# Patient Record
Sex: MALE | Race: WHITE | NOT HISPANIC OR LATINO | URBAN - METROPOLITAN AREA
[De-identification: names, ages, dates, MRNs, and addresses within clinical notes are randomized per-mention and may not be internally consistent; named-entity substitution may affect disease eponyms.]

---
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
## 2022-01-21 ENCOUNTER — TRANSCRIPTION ENCOUNTER (OUTPATIENT)
Age: 22
End: 2022-01-21

## 2022-03-18 ENCOUNTER — TRANSCRIPTION ENCOUNTER (OUTPATIENT)
Age: 22
End: 2022-03-18

## 2022-09-04 ENCOUNTER — EMERGENCY (EMERGENCY)
Facility: HOSPITAL | Age: 22
LOS: 0 days | Discharge: HOME | End: 2022-09-04
Attending: EMERGENCY MEDICINE | Admitting: EMERGENCY MEDICINE

## 2022-09-04 VITALS
WEIGHT: 141.1 LBS | SYSTOLIC BLOOD PRESSURE: 128 MMHG | HEART RATE: 69 BPM | RESPIRATION RATE: 18 BRPM | DIASTOLIC BLOOD PRESSURE: 73 MMHG | OXYGEN SATURATION: 98 % | TEMPERATURE: 99 F
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
## 2022-09-04 DIAGNOSIS — R00.2 PALPITATIONS: ICD-10-CM
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
## 2022-09-04 DIAGNOSIS — R06.02 SHORTNESS OF BREATH: ICD-10-CM

## 2022-09-04 DIAGNOSIS — R07.89 OTHER CHEST PAIN: ICD-10-CM

## 2022-09-04 DIAGNOSIS — K21.9 GASTRO-ESOPHAGEAL REFLUX DISEASE WITHOUT ESOPHAGITIS: ICD-10-CM

## 2022-09-04 DIAGNOSIS — R05.9 COUGH, UNSPECIFIED: ICD-10-CM
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
## 2022-09-04 DIAGNOSIS — Z91.018 ALLERGY TO OTHER FOODS: ICD-10-CM
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
## 2022-09-04 LAB
ALBUMIN SERPL ELPH-MCNC: 4.7 G/DL — SIGNIFICANT CHANGE UP (ref 3.5–5.2)
ALP SERPL-CCNC: 89 U/L — SIGNIFICANT CHANGE UP (ref 30–115)
ALT FLD-CCNC: 10 U/L — LOW (ref 13–38)
ANION GAP SERPL CALC-SCNC: 13 MMOL/L — SIGNIFICANT CHANGE UP (ref 7–14)
AST SERPL-CCNC: 11 U/L — LOW (ref 13–38)
BASOPHILS # BLD AUTO: 0.03 K/UL — SIGNIFICANT CHANGE UP (ref 0–0.2)
BASOPHILS NFR BLD AUTO: 0.5 % — SIGNIFICANT CHANGE UP (ref 0–1)
BILIRUB SERPL-MCNC: 1.6 MG/DL — HIGH (ref 0.2–1.2)
BUN SERPL-MCNC: 13 MG/DL — SIGNIFICANT CHANGE UP (ref 10–20)
CALCIUM SERPL-MCNC: 9.5 MG/DL — SIGNIFICANT CHANGE UP (ref 8.5–10.1)
CHLORIDE SERPL-SCNC: 102 MMOL/L — SIGNIFICANT CHANGE UP (ref 98–110)
CO2 SERPL-SCNC: 25 MMOL/L — SIGNIFICANT CHANGE UP (ref 17–32)
CREAT SERPL-MCNC: 0.7 MG/DL — SIGNIFICANT CHANGE UP (ref 0.3–1)
D DIMER BLD IA.RAPID-MCNC: <150 NG/ML DDU — SIGNIFICANT CHANGE UP (ref 0–230)
EGFR: 136 ML/MIN/1.73M2 — SIGNIFICANT CHANGE UP
EOSINOPHIL # BLD AUTO: 0.19 K/UL — SIGNIFICANT CHANGE UP (ref 0–0.7)
EOSINOPHIL NFR BLD AUTO: 3.1 % — SIGNIFICANT CHANGE UP (ref 0–8)
GLUCOSE SERPL-MCNC: 99 MG/DL — SIGNIFICANT CHANGE UP (ref 70–99)
HCT VFR BLD CALC: 40.8 % — LOW (ref 42–52)
HGB BLD-MCNC: 13.8 G/DL — LOW (ref 14–18)
IMM GRANULOCYTES NFR BLD AUTO: 0.2 % — SIGNIFICANT CHANGE UP (ref 0.1–0.3)
LIDOCAIN IGE QN: 25 U/L — SIGNIFICANT CHANGE UP (ref 7–60)
LYMPHOCYTES # BLD AUTO: 2.78 K/UL — SIGNIFICANT CHANGE UP (ref 1.2–3.4)
LYMPHOCYTES # BLD AUTO: 45.6 % — SIGNIFICANT CHANGE UP (ref 20.5–51.1)
MAGNESIUM SERPL-MCNC: 2 MG/DL — SIGNIFICANT CHANGE UP (ref 1.8–2.4)
MCHC RBC-ENTMCNC: 29.6 PG — SIGNIFICANT CHANGE UP (ref 27–31)
MCHC RBC-ENTMCNC: 33.8 G/DL — SIGNIFICANT CHANGE UP (ref 32–37)
MCV RBC AUTO: 87.6 FL — SIGNIFICANT CHANGE UP (ref 80–94)
MONOCYTES # BLD AUTO: 0.73 K/UL — HIGH (ref 0.1–0.6)
MONOCYTES NFR BLD AUTO: 12 % — HIGH (ref 1.7–9.3)
NEUTROPHILS # BLD AUTO: 2.36 K/UL — SIGNIFICANT CHANGE UP (ref 1.4–6.5)
NEUTROPHILS NFR BLD AUTO: 38.6 % — LOW (ref 42.2–75.2)
NRBC # BLD: 0 /100 WBCS — SIGNIFICANT CHANGE UP (ref 0–0)
PLATELET # BLD AUTO: 216 K/UL — SIGNIFICANT CHANGE UP (ref 130–400)
POTASSIUM SERPL-MCNC: 4.1 MMOL/L — SIGNIFICANT CHANGE UP (ref 3.5–5)
POTASSIUM SERPL-SCNC: 4.1 MMOL/L — SIGNIFICANT CHANGE UP (ref 3.5–5)
PROT SERPL-MCNC: 6.9 G/DL — SIGNIFICANT CHANGE UP (ref 6.1–8)
RBC # BLD: 4.66 M/UL — LOW (ref 4.7–6.1)
RBC # FLD: 11.8 % — SIGNIFICANT CHANGE UP (ref 11.5–14.5)
SODIUM SERPL-SCNC: 140 MMOL/L — SIGNIFICANT CHANGE UP (ref 135–146)
TROPONIN T SERPL-MCNC: <0.01 NG/ML — SIGNIFICANT CHANGE UP
WBC # BLD: 6.1 K/UL — SIGNIFICANT CHANGE UP (ref 4.8–10.8)
WBC # FLD AUTO: 6.1 K/UL — SIGNIFICANT CHANGE UP (ref 4.8–10.8)

## 2022-09-04 PROCEDURE — 99285 EMERGENCY DEPT VISIT HI MDM: CPT

## 2022-09-04 PROCEDURE — 93010 ELECTROCARDIOGRAM REPORT: CPT

## 2022-09-04 PROCEDURE — 71046 X-RAY EXAM CHEST 2 VIEWS: CPT | Mod: 26

## 2022-09-04 RX ORDER — KETOROLAC TROMETHAMINE 30 MG/ML
15 SYRINGE (ML) INJECTION ONCE
Refills: 0 | Status: DISCONTINUED | OUTPATIENT
Start: 2022-09-04 | End: 2022-09-04

## 2022-09-04 RX ORDER — METHOCARBAMOL 500 MG/1
1500 TABLET, FILM COATED ORAL ONCE
Refills: 0 | Status: DISCONTINUED | OUTPATIENT
Start: 2022-09-04 | End: 2022-09-04

## 2022-09-04 RX ORDER — FAMOTIDINE 10 MG/ML
20 INJECTION INTRAVENOUS ONCE
Refills: 0 | Status: COMPLETED | OUTPATIENT
Start: 2022-09-04 | End: 2022-09-04

## 2022-09-04 RX ORDER — SODIUM CHLORIDE 9 MG/ML
1000 INJECTION, SOLUTION INTRAVENOUS ONCE
Refills: 0 | Status: COMPLETED | OUTPATIENT
Start: 2022-09-04 | End: 2022-09-04

## 2022-09-04 RX ADMIN — Medication 15 MILLIGRAM(S): at 21:29

## 2022-09-04 RX ADMIN — FAMOTIDINE 20 MILLIGRAM(S): 10 INJECTION INTRAVENOUS at 22:00

## 2022-09-04 RX ADMIN — SODIUM CHLORIDE 1000 MILLILITER(S): 9 INJECTION, SOLUTION INTRAVENOUS at 21:29

## 2022-09-04 NOTE — ED PEDIATRIC TRIAGE NOTE - CHIEF COMPLAINT QUOTE
" I have pain in my left side chest, palpitations and slight cough x few days." " I know I pulled a muscle. I have pain in my left side chest, palpitations and slight cough x few days."

## 2022-09-04 NOTE — ED PROVIDER NOTE - CARE PROVIDER_API CALL
JOHNSON IRBY  Pediatrics  [address] [place] [address]  [address]  [place] [address]  Phone: [phone]  Fax: [phone]  Follow Up Time: 1-3 Days    Nalini Mclain)  Pediatrics  [address] [place] [address]  Phone: [phone]  Fax: [phone]  Follow Up Time: 1-3 Days

## 2022-09-04 NOTE — ED PROVIDER NOTE - NS ED ROS FT
Eyes:  No visual changes, eye pain or discharge.  ENMT:  No hearing changes, pain, discharge or infections. No neck pain or stiffness.  Cardiac:  (+) chest pain, (+) shortness of breath. (-) edema. No chest pain with exertion.  Respiratory:  (+) mild, non-productive cough. No respiratory distress. No hemoptysis. No history of asthma or RAD.  GI:  No nausea, vomiting, diarrhea or abdominal pain.  :  No dysuria, frequency or burning.  MS:  No myalgia, muscle weakness, joint pain or back pain.  Neuro:  No headache or weakness.  No LOC.  Skin:  No skin rash.   Endocrine: No history of thyroid disease or diabetes.

## 2022-09-04 NOTE — ED PROVIDER NOTE - PROGRESS NOTE DETAILS
ED Attending WINNIE Sullivan  Patient reports feeling better, all results reviewed, report has seen  Cardiologist Dr. Nielson in the past.  Copy of results provided.  Aware signs and symptoms to return for.  Comfortable with outpatient follow-up.

## 2022-09-04 NOTE — ED PROVIDER NOTE - PATIENT PORTAL LINK FT
You can access the FollowMyHealth Patient Portal offered by [facility] by registering at the following website: [email or web address]. By joining [facility]’s FollowMyHealth portal, you will also be able to view your health information using other applications (apps) compatible with our system.

## 2022-09-04 NOTE — ED PROVIDER NOTE - CLINICAL SUMMARY MEDICAL DECISION MAKING FREE TEXT BOX
Patient electing for outpatient management and will follow up immediately with cardiology. Strict return precautions discussed and patient aware they can return at any time for re-evaluation and possible admission. EKG and results discussed.  Patient is a good candidate to attempt outpatient management. Supportive care and home care discussed in detail. Patient aware they may have to return for re-evaluation and possible admission if outpatient treatment fails. Strict return precautions discussed.  Shared decision making utilized and HEART score discussed.

## 2022-09-04 NOTE — ED PROVIDER NOTE - PHYSICAL EXAMINATION
CONSTITUTIONAL: Well-developed; well-nourished; in no acute distress.   SKIN: warm, dry. no rashes.  HEAD: Normocephalic; atraumatic.  EYES: PERRLA, EOMI, no conjunctival erythema.  ENT: No nasal discharge; airway clear. mmm.  NECK: Supple; non tender.  CARD: S1, S2 normal; no murmurs, gallops, or rubs. Regular rate and rhythm. no chest wall ttp. pain is not reproducible.  RESP: No wheezes, rales or rhonchi. no stridor. not in respiratory distress. lungs ctab.  ABD: soft ntnd; no masses, rebound, or guarding. no cvat b/l.  EXT: Normal ROM.  No clubbing, cyanosis or edema. no calf ttp.  NEURO: Alert, oriented, grossly unremarkable. nml motor, strength, and sensation. no focal neuro. deficits. ambulating with a steady gait.  PSYCH: Cooperative, appropriate.

## 2022-09-04 NOTE — ED PROVIDER NOTE - OBJECTIVE STATEMENT
21 year old male with pmh of eczema, GERD presenting to the ED for L sided chest pain. Patient states that he lifted something heavy at work two days ago, has been experiencing L sided/mid-sternal chest pain since then. Associated with palpitations, the palpitations are not brought on by anything specific. Also experiencing shortness of breath. Took tylenol at home which states made the pain worse. Saw his PMD, states had a chest x-ray done which was negative but was told to come to the ED if symptoms get worse. Denies any family hx of sudden cardiac death. Denies any smoking, etoh use, or illicit drug use. No n/v. No leg swelling, no recent travel, no recent illnesses. (+) mild, non-productive cough.    Has been on medications x2 weeks for his acid reflux, states not helping much. 21 year old male with pmh of eczema, GERD presenting to the ED for L sided chest pain. Patient states that he lifted something heavy at work two days ago, has been experiencing L sided/mid-sternal chest pain since then. Pain is described as sharp, constant. Associated with palpitations, the palpitations are not brought on by anything specific. Also experiencing shortness of breath. Took tylenol at home which states made the pain worse. Saw his PMD, states had a chest x-ray done which was negative but was told to come to the ED if symptoms get worse. Denies any family hx of sudden cardiac death. Denies any smoking, etoh use, or illicit drug use. No n/v. No leg swelling, no recent travel, no recent illnesses. (+) mild, non-productive cough.    Has been on medications x2 weeks for his acid reflux, states not helping much.

## 2022-09-04 NOTE — ED PROVIDER NOTE - ATTENDING CONTRIBUTION TO CARE
21-year-old male with past medical history of eczema, GERD, has had an endoscopy in the past, recently prescribed with pantoprazole for which she has been taking, presents with a week of about left-sided chest pain, sharp, radiates to the left side of his ribs, initially intermittent today constant, alleviating or precipitating factors associated with dry cough, palpitations over the past 2 days and shortness of breath patient followed up with PMD few days ago and had a chest x-ray done that was negative and told her symptoms persisted to come to the emergency department.  No family history of PE, DVT, clotting disorders, or cardiac disease at a young age.  No hemoptysis, no recent surgeries, not on steroids/testosterone of note patient does state he did heavy lifting which she thinks exacerbated the left-sided rib and back pain and that has been improving over the past week.  No fever, chills, n/v,  pleuritic cp, diaphoresis,  ha/lh/dizziness, numbness/tingling, neck pain/ stiffness, abd pain, diarrhea, constipation, melena/brbpr, urinary symptoms, trauma, weakness, edema, calf pain/swelling/erythema, sick contacts, recent travel or rash. (-) smoker, no drug abuse.    on exam: non toxic appearing pt sittong on stretcher speaking full sentences, no rash, mmm, neck supple. non-tender , radial pulses 2/4 b/l, no jvd, pain to palpation to midsternal chest wall, ctabl w/ breath sounds present b/l, no wheezing or crackles, good air exchange, good respiratory effort, no accessory muscle use, no tachypnea, no stridor, no pain to palpation to ribs, no crepitus, no subq emphysema, no spinous ttp, no palpable shelves or step offs, bs present throughout all 4 quadrants, abd soft, nd, nt, no rebound tenderness or guarding, no cvat, FROM of ext, no calf pain/swelling/erythema, AAOx3. motor 5/5 and sensation intact throughout upper and lower ext. no focal deficits.

## 2022-09-04 NOTE — ED PEDIATRIC NURSE NOTE - CHIEF COMPLAINT QUOTE
" I know I pulled a muscle. I have pain in my left side chest, palpitations and slight cough x few days."

## 2022-09-04 NOTE — ED PROVIDER NOTE - PROVIDER TOKENS
PROVIDER:[TOKEN:[[id number]],FOLLOWUP:[1-3 Days]],PROVIDER:[TOKEN:[[id number]],FOLLOWUP:[1-3 Days]]

## 2022-09-04 NOTE — ED PROVIDER NOTE - NSFOLLOWUPINSTRUCTIONS_ED_ALL_ED_FT

## 2022-09-04 NOTE — ED PROVIDER NOTE - CARE PLAN
Assessment and plan of treatment:	Plan: EKG, CXR, labs including d dimer, pepcid, toradol, reassess.   1 Principal Discharge DX:	Chest pain  Assessment and plan of treatment:	Plan: EKG, CXR, labs including d dimer, pepcid, toradol, reassess.

## 2022-09-05 RX ORDER — CYCLOBENZAPRINE HYDROCHLORIDE 10 MG/1
1 TABLET, FILM COATED ORAL
Qty: 12 | Refills: 0
Start: 2022-09-05 | End: 2022-09-08

## 2022-09-05 NOTE — ED POST DISCHARGE NOTE - RESULT SUMMARY
PATIENT CALLED: NOW WANTS MUSCLE RELAXER SENT TO PHARM. INITIALLY REFUSED IN ED, BUT NOW WANTS MED. RX SENT FOR FLEXERIL

## 2022-12-25 ENCOUNTER — NON-APPOINTMENT (OUTPATIENT)
Age: 22
End: 2022-12-25

## 2023-02-10 ENCOUNTER — EMERGENCY (EMERGENCY)
Facility: HOSPITAL | Age: 23
LOS: 0 days | Discharge: HOME | End: 2023-02-10
Attending: STUDENT IN AN ORGANIZED HEALTH CARE EDUCATION/TRAINING PROGRAM
Payer: COMMERCIAL

## 2023-02-10 VITALS
RESPIRATION RATE: 18 BRPM | WEIGHT: 145.28 LBS | TEMPERATURE: 99 F | HEART RATE: 78 BPM | OXYGEN SATURATION: 98 % | SYSTOLIC BLOOD PRESSURE: 118 MMHG | DIASTOLIC BLOOD PRESSURE: 59 MMHG

## 2023-02-10 DIAGNOSIS — S05.01XA INJURY OF CONJUNCTIVA AND CORNEAL ABRASION WITHOUT FOREIGN BODY, RIGHT EYE, INITIAL ENCOUNTER: ICD-10-CM
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
## 2023-02-10 DIAGNOSIS — H57.11 OCULAR PAIN, RIGHT EYE: ICD-10-CM

## 2023-02-10 DIAGNOSIS — K21.9 GASTRO-ESOPHAGEAL REFLUX DISEASE WITHOUT ESOPHAGITIS: ICD-10-CM

## 2023-02-10 DIAGNOSIS — T15.91XA FOREIGN BODY ON EXTERNAL EYE, PART UNSPECIFIED, RIGHT EYE, INITIAL ENCOUNTER: ICD-10-CM
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
## 2023-02-10 DIAGNOSIS — Z91.018 ALLERGY TO OTHER FOODS: ICD-10-CM
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
## 2023-02-10 PROCEDURE — 99283 EMERGENCY DEPT VISIT LOW MDM: CPT | Mod: 25

## 2023-02-10 PROCEDURE — 99284 EMERGENCY DEPT VISIT MOD MDM: CPT

## 2023-02-10 PROCEDURE — 67938 REMOVE EYELID FOREIGN BODY: CPT | Mod: RT

## 2023-02-10 RX ORDER — FLUORESCEIN SODIUM 9 MG
1 STRIP OPHTHALMIC (EYE) ONCE
Refills: 0 | Status: COMPLETED | OUTPATIENT
Start: 2023-02-10 | End: 2023-02-10

## 2023-02-10 RX ORDER — POLYMYXIN B SULF/TRIMETHOPRIM 10000-1/ML
1 DROPS OPHTHALMIC (EYE) ONCE
Refills: 0 | Status: COMPLETED | OUTPATIENT
Start: 2023-02-10 | End: 2023-02-10

## 2023-02-10 RX ADMIN — Medication 1 DROP(S): at 19:59

## 2023-02-10 RX ADMIN — Medication 1 DROP(S): at 19:01

## 2023-02-10 RX ADMIN — Medication 1 APPLICATION(S): at 19:02

## 2023-02-10 NOTE — ED PROVIDER NOTE - PATIENT PORTAL LINK FT
You can access the FollowMyHealth Patient Portal offered by [facility] by registering at the following website: [email or web address]. By joining [facility]’s FollowMyHealth portal, you will also be able to view your health information using other applications (apps) compatible with our system.

## 2023-02-10 NOTE — ED PROVIDER NOTE - CLINICAL SUMMARY MEDICAL DECISION MAKING FREE TEXT BOX
22-year-old male with no relevant past medical history, up-to-date on tetanus, does not wear contact lenses presenting today with foreign body to the right eye.  Patient is a , was working with aluminum, endorses feeling a foreign body entry of the right eye this morning, since then has had pain and tearing.  Denies any changes in vision.  Denies any fevers.    Vital stable, on exam noted to have conjunctival injection to the right eye, extraocular was intact.  With fluorescein staining noted to have corneal abrasion at approximately 12:00 on sclera.  Negative Júnior sign.  Minuscule is foreign body to the upper inner eyelid removed with Q-tip.    Patient given Polytrim drops with instructions to use every 3 hours while awake.  Follow-up with PCP this week.  Return precaution discussed.

## 2023-02-10 NOTE — ED PROVIDER NOTE - NSFOLLOWUPINSTRUCTIONS_ED_ALL_ED_FT
Corneal Abrasion    A corneal abrasion is a scratch or injury to the clear covering over the front of your eye (cornea). Your cornea forms a clear dome that protects your eye and helps to focus your vision. Your cornea is made up of many layers. The surface layer is a single layer of cells (corneal epithelium). It is one of the most sensitive tissues in your body. A corneal abrasion can be very painful.    If a corneal abrasion is not treated, it can become infected and cause an ulcer. This can lead to scarring. A scarred cornea can affect your vision. Sometimes abrasions come back in the same area, even after the original injury has healed (recurrent erosion syndrome).    What are the causes?  This condition may be caused by:  A poke in the eye.  A gritty or irritating substance (foreign body) in the eye.  Excessive eye rubbing.  Very dry eyes.  Certain eye infections.  Contact lenses that fit poorly or are worn for a long period of time. You can also injure your cornea when putting contacts lenses in your eye or taking them out.  Eye surgery.  Sometimes, the cause is unknown.    What are the signs or symptoms?  Symptoms of this condition include:  Eye pain. The pain may get worse when your eye is open or when you move your eye.  A feeling of something stuck in your eye.  Having trouble keeping your eye open, or not being able to keep it open.  Tearing and redness.  Sensitivity to light.  Blurred vision.  Headache.  How is this diagnosed?  This condition may be diagnosed based on:  Your medical history.  Your symptoms.  An eye exam. You may work with a health care provider who specializes in diseases and conditions of the eye (ophthalmologist). Before the eye exam, numbing drops may be put into your eye. You may also have dye put in your eye with a dropper or a small paper strip. The dye makes the abrasion easy to see when your ophthalmologist examines your eye with a light. Your ophthalmologist may look at your eye through an eye scope (slit lamp).  How is this treated?  Treatment may vary depending on the cause of your condition, and it may include:  Washing out your eye.  Removing any foreign body.  Antibiotic drops or ointment to treat an infection.  Steroid drops or ointment to treat redness, irritation, or inflammation.  Pain medicine.  An eye patch to keep your eye closed.  Follow these instructions at home:  Medicines     Use eye drops or ointments as told by your eye care provider.  If you were prescribed antibiotic drops or ointment, use them as told by your eye care provider. Do not stop using the antibiotic even if you start to feel better.  Take over-the-counter and prescription medicines only as told by your eye care provider.  Do not drive or use heavy machinery while taking prescription pain medicine.  General instructions     If you have an eye patch, wear it as told by your eye care provider.  Do not drive or use machinery while wearing an eye patch. Your ability to  distances will be impaired.  Follow instructions from your eye care provider about when to remove the patch.  Ask your eye care provider whether you can use a cold, wet cloth (compress) on your eye to relieve pain.  Do not rub or touch your eye. Do not wash out your eye.  Do not wear contact lenses until your eye care provider says that this is okay.  Avoid bright light and eye strain.  Keep all follow-up visits as told by your eye care provider. This is important for preventing infection and vision loss.  Contact a health care provider if:  You continue to have eye pain and other symptoms for more than 2 days.  You develop new symptoms, such as redness, tearing, or discharge.  You have discharge that makes your eyelids stick together in the morning.  Your eye patch becomes so loose that you can blink your eye.  Symptoms return after the original abrasion has healed.  Get help right away if:  You have severe eye pain that does not get better with medicine.  You have vision loss.  Summary  A corneal abrasion is a scratch on the outer layer of the clear covering over the front of your eye (cornea).  Corneal abrasion can cause eye pain, redness, tearing, and blurred vision.  This condition is usually treated with medicine to prevent infection and scarring. You also may have to wear an eye patch to cover your eye.  Let your eye care provider know if your symptoms continue for more than 2 days.  This information is not intended to replace advice given to you by your health care provider. Make sure you discuss any questions you have with your health care provider.    A foreign body is an object on or in the eye that should not be there. It could be a speck of dirt or dust, a hair, an eyelash, a splinter, or any other object. It can be on the outside of the eyeball (extraocular) or inside the eyeball. If the object is on the outside of the eyeball, it can usually be washed out or taken out by your doctor. An object inside the eyeball is an emergency, and it must be treated with surgery.  Follow these instructions at home:  Take over-the-counter and prescription medicines only as told by your doctor. Use eye drops or ointment as told.If you were prescribed antibiotic drops or ointment, use it as told by your doctor. Do not stop using it even if you start to feel better.If you have a bandage on your eye (eye shield):  Wear it as told. Follow instructions from your doctor about when to take it off.Do not drive or use heavy machinery while wearing the bandage.If you do not have a bandage on your eye:  Keep your eye closed as much as possible.Do not rub your eye.Wear dark glasses in bright light.Do not wear contact lenses until your eye feels normal, or as told by your doctor.If you are doing activities with a high risk of eye injury, such as using high-speed tools, wear protective eye covering.Keep all follow-up visits as told by your doctor. This is important.Contact a doctor if:  You have more pain in your eye.You have problems with your eye bandage.You have abnormal fluid (discharge) coming from your eye.Get help right away if:  Your ability to see (vision) gets worse.You have more redness and swelling in or around your eye.Summary  A foreign body is an object on or in the eye that should not be there.An object on the outside of the eyeball can usually be washed out or taken out by your doctor. An object inside the eyeball is an emergency.If you have a bandage on your eye (eye shield), do not drive while wearing it.If you have more pain in your eye, contact your doctor.This information is not intended to replace advice given to you by your health care provider. Make sure you discuss any questions you have with your health care provider.

## 2023-02-10 NOTE — ED PROVIDER NOTE - OBJECTIVE STATEMENT
23 yo M UTD on vaccines and no PMH presenting for R eye pain that started at work a few hours prior. 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 Denies visual changes. Attempted irrigation at home, but still felt pain so came to ER. Denies headache, dizziness, n/w/t, HT, LOC, AC, fever, CP, SOB. Does not wear contacts.

## 2023-02-10 NOTE — ED PROVIDER NOTE - NSFOLLOWUPCLINICS_GEN_ALL_ED_FT
[facility]  Ophthalmolgy  [address], [address]  [place] [address]  Phone: [phone]  Fax:   Follow Up Time: 1-3 Days

## 2023-02-10 NOTE — ED PROVIDER NOTE - PHYSICAL EXAMINATION
CONSTITUTIONAL: well-appearing, in NAD  SKIN: Warm dry, normal skin turgor  HEAD: NCAT  EYES: EOMI, PERRLA, no scleral icterus, conjunctiva pink; +fluoroscein uptake in middle upper quadrant of R eye with small punctate foreign body in upper lid.   ENT: normal pharynx with no erythema or exudates  NECK: Supple; non tender. Full ROM.  CARD: RRR, no murmurs.  RESP: clear to ausculation b/l. No crackles or wheezing.  ABD: soft, non-tender, non-distended, no rebound or guarding.  EXT: Full ROM, no bony tenderness, no pedal edema, no calf tenderness  NEURO: normal motor. normal sensory.  Normal gait.  PSYCH: Cooperative, appropriate.

## 2023-02-10 NOTE — ED PEDIATRIC TRIAGE NOTE - CHIEF COMPLAINT QUOTE
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

## 2023-07-24 ENCOUNTER — NON-APPOINTMENT (OUTPATIENT)
Age: 23
End: 2023-07-24

## 2023-07-24 ENCOUNTER — APPOINTMENT (OUTPATIENT)
Dept: ORTHOPEDIC SURGERY | Facility: CLINIC | Age: 23
End: 2023-07-24
Payer: COMMERCIAL

## 2023-07-24 VITALS — WEIGHT: 145 LBS | BODY MASS INDEX: 28.47 KG/M2 | HEIGHT: 60 IN

## 2023-07-24 DIAGNOSIS — S99.921A UNSPECIFIED INJURY OF RIGHT FOOT, INITIAL ENCOUNTER: ICD-10-CM

## 2023-07-24 PROCEDURE — 99203 OFFICE O/P NEW LOW 30 MIN: CPT

## 2023-07-24 PROCEDURE — 73630 X-RAY EXAM OF FOOT: CPT | Mod: RT

## 2023-07-24 PROCEDURE — L4361: CPT | Mod: RT

## 2023-07-24 PROCEDURE — 73610 X-RAY EXAM OF ANKLE: CPT | Mod: RT

## 2023-07-24 RX ORDER — NAPROXEN 500 MG/1
500 TABLET ORAL
Qty: 28 | Refills: 0 | Status: ACTIVE | COMMUNITY
Start: 2023-07-24 | End: 1900-01-01

## 2023-07-24 NOTE — DATA REVIEWED
[FreeTextEntry1] : X-ray right foot: No acute fractures, subluxations, dislocations. [FreeTextEntry2] : X-ray right ankle: No acute fractures, subluxations, dislocations.

## 2023-07-24 NOTE — DISCUSSION/SUMMARY
[de-identified] : Discussed in detail with patient that he has a right foot/ankle sprain.  Discussed treatment options detail including rest, ice/heat, warm soaks.  Naproxen sent to patient's pharmacy, discuss side effects in detail.  Placed patient in tall cam walker boot weightbearing as tolerated.  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  Patient will call if symptoms worsen or change.  Patient understands agrees with plan.  Call if any questions or concerns.

## 2023-07-24 NOTE — HISTORY OF PRESENT ILLNESS
[de-identified] : Patient is a 22-year-old male accompanied by mother here for evaluation of right foot/ankle.  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  Patient states early this morning he went to sit down stair and twisted his right ankle/foot.  Patient was immediate pain and having trouble bearing weight.  Patient states at this point he has pain when ambulating and bearing weight.  Patient states that he was having mild tingling feeling in the foot which has been improving.

## 2023-07-24 NOTE — PHYSICAL EXAM
[Right] : right foot and ankle [] : negative Parra test [FreeTextEntry8] : Anterior ankle tenderness [FreeTextEntry9] : Mild decreased range of motion with pain [de-identified] : Mild decrease strength with pain

## 2023-08-02 ENCOUNTER — APPOINTMENT (OUTPATIENT)
Dept: ORTHOPEDIC SURGERY | Facility: CLINIC | Age: 23
End: 2023-08-02
Payer: COMMERCIAL

## 2023-08-02 VITALS — WEIGHT: 145 LBS | BODY MASS INDEX: 20.3 KG/M2 | HEIGHT: 71 IN

## 2023-08-02 PROCEDURE — L1902: CPT | Mod: RT

## 2023-08-02 PROCEDURE — 99213 OFFICE O/P EST LOW 20 MIN: CPT

## 2023-08-02 NOTE — HISTORY OF PRESENT ILLNESS
[de-identified] : This is a 22-year-old patient who comes in today with right ankle pain.  He sustained an injury approximately a week and a half ago.  He was seen by one of our physician assistants who recommended using a boot.  He still has pain.  He localized the pain laterally as well as medially.  He denies any calf pain chest pain shortness of breath.

## 2023-08-02 NOTE — DISCUSSION/SUMMARY
[de-identified] : I discussed the patient's findings with him.  At this time the patient would benefit from physical therapy.  He can transition to a lace up ankle brace.  If he still symptomatic in 4 to 6 weeks I will see him back at that time.  All questions answered.

## 2023-08-02 NOTE — PHYSICAL EXAM
[de-identified] : Alert oriented x3.  Pleasant cooperative that exam.  Examination of right lower extremity is undertaken.  Tender palpation over the ATFL.  Full range of motion of the ankle hindfoot midfoot forefoot.  Neurovascular intact distally.

## 2023-08-17 ENCOUNTER — APPOINTMENT (OUTPATIENT)
Dept: ORTHOPEDIC SURGERY | Facility: CLINIC | Age: 23
End: 2023-08-17

## 2023-08-18 ENCOUNTER — APPOINTMENT (OUTPATIENT)
Dept: ORTHOPEDIC SURGERY | Facility: CLINIC | Age: 23
End: 2023-08-18
Payer: COMMERCIAL

## 2023-08-18 PROCEDURE — 99213 OFFICE O/P EST LOW 20 MIN: CPT

## 2023-08-18 NOTE — HISTORY OF PRESENT ILLNESS
[de-identified] :  patient is here for follow-up of his right ankle injury.  He still having significant pain especially at night.  He is been doing physical therapy.  He localized the pain anterolaterally medially.
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
## 2023-08-18 NOTE — PHYSICAL EXAM
[de-identified] :   I examined his right lower extremity.  He still demonstrates tenderness over the anterolateral aspect of his ankle.  This is worsened with dorsiflexion and passive inversion.  He is neurovascular intact distally.

## 2023-08-26 ENCOUNTER — APPOINTMENT (OUTPATIENT)
Dept: MRI IMAGING | Facility: CLINIC | Age: 23
End: 2023-08-26

## 2023-08-29 ENCOUNTER — APPOINTMENT (OUTPATIENT)
Dept: MRI IMAGING | Facility: CLINIC | Age: 23
End: 2023-08-29
Payer: COMMERCIAL

## 2023-08-29 PROCEDURE — 73721 MRI JNT OF LWR EXTRE W/O DYE: CPT | Mod: RT

## 2023-09-01 ENCOUNTER — APPOINTMENT (OUTPATIENT)
Dept: ORTHOPEDIC SURGERY | Facility: CLINIC | Age: 23
End: 2023-09-01
Payer: COMMERCIAL

## 2023-09-01 DIAGNOSIS — S99.911A UNSPECIFIED INJURY OF RIGHT ANKLE, INITIAL ENCOUNTER: ICD-10-CM

## 2023-09-01 PROCEDURE — 99213 OFFICE O/P EST LOW 20 MIN: CPT

## 2023-09-03 PROBLEM — S99.911A INJURY OF RIGHT ANKLE, INITIAL ENCOUNTER: Status: ACTIVE | Noted: 2023-07-24

## 2023-09-03 NOTE — DISCUSSION/SUMMARY
[de-identified] : I discussed the patient's findings with him.  He can utilize physical therapy.  He will continue taking NSAIDs and modifying his activity.  He is currently out of work but I think he is able to return to work in approximately 2 weeks.  He is in agreement with this plan.  All questions answered.

## 2023-09-03 NOTE — PHYSICAL EXAM
[de-identified] : He has painless range of motion of the ankle.  Tender to palpation medially over the posterior tib tendon and deltoid ligament.  Tender laterally over the anterior process of the calcaneus.
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
## 2023-09-03 NOTE — HISTORY OF PRESENT ILLNESS
[de-identified] : Patient returns today for follow-up of his right foot MRI.  His pain is improving slightly.  However he still localizes the pain mostly over the medial aspect of the ankle and lateral aspect of his foot.  He has been utilizing NSAIDs and shoewear modification.

## 2023-09-09 ENCOUNTER — EMERGENCY (EMERGENCY)
Facility: HOSPITAL | Age: 23
LOS: 0 days | Discharge: HOME | End: 2023-09-10
Attending: PEDIATRICS
Payer: COMMERCIAL

## 2023-09-09 VITALS
DIASTOLIC BLOOD PRESSURE: 67 MMHG | SYSTOLIC BLOOD PRESSURE: 130 MMHG | HEART RATE: 97 BPM | RESPIRATION RATE: 18 BRPM | WEIGHT: 138.01 LBS | TEMPERATURE: 98 F | OXYGEN SATURATION: 100 %

## 2023-09-09 DIAGNOSIS — R10.31 RIGHT LOWER QUADRANT PAIN: ICD-10-CM

## 2023-09-09 DIAGNOSIS — K59.00 CONSTIPATION, UNSPECIFIED: ICD-10-CM

## 2023-09-09 DIAGNOSIS — Z87.19 PERSONAL HISTORY OF OTHER DISEASES OF THE DIGESTIVE SYSTEM: ICD-10-CM

## 2023-09-09 LAB
ALBUMIN SERPL ELPH-MCNC: 4.8 G/DL — SIGNIFICANT CHANGE UP (ref 3.5–5.2)
ALP SERPL-CCNC: 93 U/L — SIGNIFICANT CHANGE UP (ref 30–115)
ALT FLD-CCNC: 9 U/L — LOW (ref 13–38)
ANION GAP SERPL CALC-SCNC: 9 MMOL/L — SIGNIFICANT CHANGE UP (ref 7–14)
APPEARANCE UR: ABNORMAL
AST SERPL-CCNC: 12 U/L — LOW (ref 13–38)
BACTERIA # UR AUTO: NEGATIVE /HPF — SIGNIFICANT CHANGE UP
BASOPHILS # BLD AUTO: 0.05 K/UL — SIGNIFICANT CHANGE UP (ref 0–0.2)
BASOPHILS NFR BLD AUTO: 0.5 % — SIGNIFICANT CHANGE UP (ref 0–1)
BILIRUB SERPL-MCNC: 1.5 MG/DL — HIGH (ref 0.2–1.2)
BILIRUB UR-MCNC: NEGATIVE — SIGNIFICANT CHANGE UP
BUN SERPL-MCNC: 14 MG/DL — SIGNIFICANT CHANGE UP (ref 10–20)
CALCIUM SERPL-MCNC: 9.6 MG/DL — SIGNIFICANT CHANGE UP (ref 8.4–10.5)
CAST: 2 /LPF — SIGNIFICANT CHANGE UP (ref 0–4)
CHLORIDE SERPL-SCNC: 102 MMOL/L — SIGNIFICANT CHANGE UP (ref 98–110)
CO2 SERPL-SCNC: 28 MMOL/L — SIGNIFICANT CHANGE UP (ref 17–32)
COLOR SPEC: YELLOW — SIGNIFICANT CHANGE UP
CREAT SERPL-MCNC: 0.9 MG/DL — SIGNIFICANT CHANGE UP (ref 0.7–1.5)
DIFF PNL FLD: NEGATIVE — SIGNIFICANT CHANGE UP
EGFR: 125 ML/MIN/1.73M2 — SIGNIFICANT CHANGE UP
EOSINOPHIL # BLD AUTO: 0.12 K/UL — SIGNIFICANT CHANGE UP (ref 0–0.7)
EOSINOPHIL NFR BLD AUTO: 1.3 % — SIGNIFICANT CHANGE UP (ref 0–8)
GLUCOSE SERPL-MCNC: 97 MG/DL — SIGNIFICANT CHANGE UP (ref 70–99)
GLUCOSE UR QL: NEGATIVE MG/DL — SIGNIFICANT CHANGE UP
HCT VFR BLD CALC: 44.4 % — SIGNIFICANT CHANGE UP (ref 42–52)
HGB BLD-MCNC: 14.8 G/DL — SIGNIFICANT CHANGE UP (ref 14–18)
IMM GRANULOCYTES NFR BLD AUTO: 0.2 % — SIGNIFICANT CHANGE UP (ref 0.1–0.3)
KETONES UR-MCNC: NEGATIVE MG/DL — SIGNIFICANT CHANGE UP
LEUKOCYTE ESTERASE UR-ACNC: NEGATIVE — SIGNIFICANT CHANGE UP
LYMPHOCYTES # BLD AUTO: 1.58 K/UL — SIGNIFICANT CHANGE UP (ref 1.2–3.4)
LYMPHOCYTES # BLD AUTO: 17.3 % — LOW (ref 20.5–51.1)
MCHC RBC-ENTMCNC: 29.5 PG — SIGNIFICANT CHANGE UP (ref 27–31)
MCHC RBC-ENTMCNC: 33.3 G/DL — SIGNIFICANT CHANGE UP (ref 32–37)
MCV RBC AUTO: 88.6 FL — SIGNIFICANT CHANGE UP (ref 80–94)
MONOCYTES # BLD AUTO: 0.73 K/UL — HIGH (ref 0.1–0.6)
MONOCYTES NFR BLD AUTO: 8 % — SIGNIFICANT CHANGE UP (ref 1.7–9.3)
NEUTROPHILS # BLD AUTO: 6.63 K/UL — HIGH (ref 1.4–6.5)
NEUTROPHILS NFR BLD AUTO: 72.7 % — SIGNIFICANT CHANGE UP (ref 42.2–75.2)
NITRITE UR-MCNC: NEGATIVE — SIGNIFICANT CHANGE UP
NRBC # BLD: 0 /100 WBCS — SIGNIFICANT CHANGE UP (ref 0–0)
PH UR: 7 — SIGNIFICANT CHANGE UP (ref 5–8)
PLATELET # BLD AUTO: 232 K/UL — SIGNIFICANT CHANGE UP (ref 130–400)
PMV BLD: 10.6 FL — HIGH (ref 7.4–10.4)
POTASSIUM SERPL-MCNC: 4.6 MMOL/L — SIGNIFICANT CHANGE UP (ref 3.5–5)
POTASSIUM SERPL-SCNC: 4.6 MMOL/L — SIGNIFICANT CHANGE UP (ref 3.5–5)
PROT SERPL-MCNC: 7.3 G/DL — SIGNIFICANT CHANGE UP (ref 6–8)
PROT UR-MCNC: NEGATIVE MG/DL — SIGNIFICANT CHANGE UP
RBC # BLD: 5.01 M/UL — SIGNIFICANT CHANGE UP (ref 4.7–6.1)
RBC # FLD: 11.5 % — SIGNIFICANT CHANGE UP (ref 11.5–14.5)
RBC CASTS # UR COMP ASSIST: 1 /HPF — SIGNIFICANT CHANGE UP (ref 0–4)
SODIUM SERPL-SCNC: 139 MMOL/L — SIGNIFICANT CHANGE UP (ref 135–146)
SP GR SPEC: 1.03 — SIGNIFICANT CHANGE UP (ref 1–1.03)
SQUAMOUS # UR AUTO: 0 /HPF — SIGNIFICANT CHANGE UP (ref 0–5)
UROBILINOGEN FLD QL: 1 MG/DL — SIGNIFICANT CHANGE UP (ref 0.2–1)
WBC # BLD: 9.13 K/UL — SIGNIFICANT CHANGE UP (ref 4.8–10.8)
WBC # FLD AUTO: 9.13 K/UL — SIGNIFICANT CHANGE UP (ref 4.8–10.8)
WBC UR QL: 1 /HPF — SIGNIFICANT CHANGE UP (ref 0–5)

## 2023-09-09 PROCEDURE — 99285 EMERGENCY DEPT VISIT HI MDM: CPT

## 2023-09-09 PROCEDURE — 80053 COMPREHEN METABOLIC PANEL: CPT

## 2023-09-09 PROCEDURE — 99284 EMERGENCY DEPT VISIT MOD MDM: CPT | Mod: 25

## 2023-09-09 PROCEDURE — 85025 COMPLETE CBC W/AUTO DIFF WBC: CPT

## 2023-09-09 PROCEDURE — 81001 URINALYSIS AUTO W/SCOPE: CPT

## 2023-09-09 PROCEDURE — 36415 COLL VENOUS BLD VENIPUNCTURE: CPT

## 2023-09-09 PROCEDURE — 74177 CT ABD & PELVIS W/CONTRAST: CPT | Mod: MA

## 2023-09-09 PROCEDURE — 74018 RADEX ABDOMEN 1 VIEW: CPT

## 2023-09-09 PROCEDURE — 87086 URINE CULTURE/COLONY COUNT: CPT

## 2023-09-09 PROCEDURE — 87591 N.GONORRHOEAE DNA AMP PROB: CPT

## 2023-09-09 RX ADMIN — Medication 1 ENEMA: at 23:22

## 2023-09-10 VITALS
HEART RATE: 75 BPM | OXYGEN SATURATION: 100 % | RESPIRATION RATE: 18 BRPM | TEMPERATURE: 98 F | SYSTOLIC BLOOD PRESSURE: 110 MMHG | DIASTOLIC BLOOD PRESSURE: 62 MMHG

## 2023-09-10 PROCEDURE — 74018 RADEX ABDOMEN 1 VIEW: CPT | Mod: 26

## 2023-09-10 PROCEDURE — 74177 CT ABD & PELVIS W/CONTRAST: CPT | Mod: 26,MA

## 2023-09-10 RX ORDER — IOHEXOL 300 MG/ML
30 INJECTION, SOLUTION INTRAVENOUS ONCE
Refills: 0 | Status: COMPLETED | OUTPATIENT
Start: 2023-09-10 | End: 2023-09-10

## 2023-09-10 RX ORDER — ONDANSETRON 8 MG/1
8 TABLET, FILM COATED ORAL ONCE
Refills: 0 | Status: COMPLETED | OUTPATIENT
Start: 2023-09-10 | End: 2023-09-10

## 2023-09-10 RX ORDER — SODIUM CHLORIDE 9 MG/ML
1000 INJECTION INTRAMUSCULAR; INTRAVENOUS; SUBCUTANEOUS ONCE
Refills: 0 | Status: COMPLETED | OUTPATIENT
Start: 2023-09-10 | End: 2023-09-10

## 2023-09-10 RX ADMIN — IOHEXOL 30 MILLILITER(S): 300 INJECTION, SOLUTION INTRAVENOUS at 00:41

## 2023-09-10 RX ADMIN — ONDANSETRON 8 MILLIGRAM(S): 8 TABLET, FILM COATED ORAL at 00:41

## 2023-09-10 RX ADMIN — SODIUM CHLORIDE 1000 MILLILITER(S): 9 INJECTION INTRAMUSCULAR; INTRAVENOUS; SUBCUTANEOUS at 00:45

## 2023-09-10 NOTE — ED ADULT NURSE NOTE - NSFALLUNIVINTERV_ED_ALL_ED
Bed/Stretcher in lowest position, wheels locked, appropriate side rails in place/Call bell, personal items and telephone in reach/Instruct patient to call for assistance before getting out of bed/chair/stretcher/Non-slip footwear applied when patient is off stretcher/[place] to call system/Physically safe environment - no spills, clutter or unnecessary equipment/Purposeful proactive rounding/Room/bathroom lighting operational, light cord in reach

## 2023-09-10 NOTE — ED PROVIDER NOTE - NSFOLLOWUPINSTRUCTIONS_ED_ALL_ED_FT
Bowel Clean Out    Please complete the following:   - 14 caps of Miralax in 2L of water, please complete within 2 hours  > stools will become "apple juice colored liquid"  >> if stools are still formed or loose, please repeat the clean out    Please begin a daily fiber supplement    Continue 1 cap of Miralax daily

## 2023-09-10 NOTE — ED PROVIDER NOTE - OBJECTIVE STATEMENT
22-year-old male no past medical history presenting with right flank and right lower quadrant pain for the past week and a half that has worsened in severity.  Patient notes that he had not been able to stool for 5 days and only after taking a large quantity of MiraLAX and Dulcolax with able to produce a small amount of stool.  Patient reports mild pain with urination and stooling.  Denies blood in the urine or stool or toilet paper.  Patient usually stools 3 times per week.  Patient drinks plenty of water, reports minimal fiber intake.  Patient notes that the pain comes and goes and has not been consistent for the past week and a half but the pain is now sharper and more painful.  Patient has experienced similar symptoms in the past with constipation however notes that this time it is more severe.  Denies nausea, vomiting, chest pain, shortness of breath, history of kidney stones or trauma.

## 2023-09-10 NOTE — ED PROVIDER NOTE - PATIENT PORTAL LINK FT
You can access the FollowMyHealth Patient Portal offered by [facility] by registering at the following website: [email or web address]. By joining [employer]’s FollowMyHealth portal, you will also be able to view your health information using other applications (apps) compatible with our system.

## 2023-09-10 NOTE — ED PROVIDER NOTE - PHYSICAL EXAMINATION
General: Awake, alert, NAD.  HEENT: NCAT, PERRL, oropharynx without erythema or exudates, TMs non-bulging, non-erythematous, moist mucous membranes.  RESP: CTAB, no increased work of breathing.  CVS: S1, S2, no murmurs, cap refill <2 sec, 2+ peripheral pulses.  ABD: (+) BS, soft, NTND, + stool burden, no CVA tenderness

## 2023-09-11 ENCOUNTER — EMERGENCY (EMERGENCY)
Facility: HOSPITAL | Age: 23
LOS: 0 days | Discharge: HOME | End: 2023-09-11
Attending: STUDENT IN AN ORGANIZED HEALTH CARE EDUCATION/TRAINING PROGRAM
Payer: COMMERCIAL

## 2023-09-11 VITALS
DIASTOLIC BLOOD PRESSURE: 77 MMHG | WEIGHT: 136.03 LBS | TEMPERATURE: 98 F | SYSTOLIC BLOOD PRESSURE: 131 MMHG | RESPIRATION RATE: 18 BRPM | HEART RATE: 88 BPM | OXYGEN SATURATION: 98 %

## 2023-09-11 DIAGNOSIS — R10.31 RIGHT LOWER QUADRANT PAIN: ICD-10-CM

## 2023-09-11 DIAGNOSIS — R10.11 RIGHT UPPER QUADRANT PAIN: ICD-10-CM

## 2023-09-11 DIAGNOSIS — Z87.19 PERSONAL HISTORY OF OTHER DISEASES OF THE DIGESTIVE SYSTEM: ICD-10-CM

## 2023-09-11 DIAGNOSIS — R11.10 VOMITING, UNSPECIFIED: ICD-10-CM

## 2023-09-11 DIAGNOSIS — K59.00 CONSTIPATION, UNSPECIFIED: ICD-10-CM

## 2023-09-11 LAB
ALBUMIN SERPL ELPH-MCNC: 5 G/DL — SIGNIFICANT CHANGE UP (ref 3.5–5.2)
ALP SERPL-CCNC: 81 U/L — SIGNIFICANT CHANGE UP (ref 30–115)
ALT FLD-CCNC: 14 U/L — SIGNIFICANT CHANGE UP (ref 13–38)
ANION GAP SERPL CALC-SCNC: 13 MMOL/L — SIGNIFICANT CHANGE UP (ref 7–14)
AST SERPL-CCNC: 44 U/L — HIGH (ref 13–38)
BASOPHILS # BLD AUTO: 0.04 K/UL — SIGNIFICANT CHANGE UP (ref 0–0.2)
BASOPHILS NFR BLD AUTO: 0.7 % — SIGNIFICANT CHANGE UP (ref 0–1)
BILIRUB SERPL-MCNC: 2.6 MG/DL — HIGH (ref 0.2–1.2)
BUN SERPL-MCNC: 11 MG/DL — SIGNIFICANT CHANGE UP (ref 10–20)
CALCIUM SERPL-MCNC: 9.8 MG/DL — SIGNIFICANT CHANGE UP (ref 8.4–10.5)
CHLORIDE SERPL-SCNC: 99 MMOL/L — SIGNIFICANT CHANGE UP (ref 98–110)
CO2 SERPL-SCNC: 23 MMOL/L — SIGNIFICANT CHANGE UP (ref 17–32)
CREAT SERPL-MCNC: 1 MG/DL — SIGNIFICANT CHANGE UP (ref 0.7–1.5)
CULTURE RESULTS: SIGNIFICANT CHANGE UP
EGFR: 110 ML/MIN/1.73M2 — SIGNIFICANT CHANGE UP
EOSINOPHIL # BLD AUTO: 0.06 K/UL — SIGNIFICANT CHANGE UP (ref 0–0.7)
EOSINOPHIL NFR BLD AUTO: 1 % — SIGNIFICANT CHANGE UP (ref 0–8)
GLUCOSE SERPL-MCNC: 82 MG/DL — SIGNIFICANT CHANGE UP (ref 70–99)
HCT VFR BLD CALC: 44.9 % — SIGNIFICANT CHANGE UP (ref 42–52)
HGB BLD-MCNC: 15.3 G/DL — SIGNIFICANT CHANGE UP (ref 14–18)
IMM GRANULOCYTES NFR BLD AUTO: 0.3 % — SIGNIFICANT CHANGE UP (ref 0.1–0.3)
LYMPHOCYTES # BLD AUTO: 1.43 K/UL — SIGNIFICANT CHANGE UP (ref 1.2–3.4)
LYMPHOCYTES # BLD AUTO: 23.3 % — SIGNIFICANT CHANGE UP (ref 20.5–51.1)
MCHC RBC-ENTMCNC: 29.8 PG — SIGNIFICANT CHANGE UP (ref 27–31)
MCHC RBC-ENTMCNC: 34.1 G/DL — SIGNIFICANT CHANGE UP (ref 32–37)
MCV RBC AUTO: 87.4 FL — SIGNIFICANT CHANGE UP (ref 80–94)
MONOCYTES # BLD AUTO: 0.64 K/UL — HIGH (ref 0.1–0.6)
MONOCYTES NFR BLD AUTO: 10.4 % — HIGH (ref 1.7–9.3)
NEUTROPHILS # BLD AUTO: 3.96 K/UL — SIGNIFICANT CHANGE UP (ref 1.4–6.5)
NEUTROPHILS NFR BLD AUTO: 64.3 % — SIGNIFICANT CHANGE UP (ref 42.2–75.2)
NRBC # BLD: 0 /100 WBCS — SIGNIFICANT CHANGE UP (ref 0–0)
PLATELET # BLD AUTO: 209 K/UL — SIGNIFICANT CHANGE UP (ref 130–400)
PMV BLD: 10.2 FL — SIGNIFICANT CHANGE UP (ref 7.4–10.4)
POTASSIUM SERPL-MCNC: SIGNIFICANT CHANGE UP MMOL/L (ref 3.5–5)
POTASSIUM SERPL-SCNC: SIGNIFICANT CHANGE UP MMOL/L (ref 3.5–5)
PROT SERPL-MCNC: 8.2 G/DL — HIGH (ref 6–8)
RBC # BLD: 5.14 M/UL — SIGNIFICANT CHANGE UP (ref 4.7–6.1)
RBC # FLD: 11.4 % — LOW (ref 11.5–14.5)
SODIUM SERPL-SCNC: 135 MMOL/L — SIGNIFICANT CHANGE UP (ref 135–146)
SPECIMEN SOURCE: SIGNIFICANT CHANGE UP
WBC # BLD: 6.15 K/UL — SIGNIFICANT CHANGE UP (ref 4.8–10.8)
WBC # FLD AUTO: 6.15 K/UL — SIGNIFICANT CHANGE UP (ref 4.8–10.8)

## 2023-09-11 PROCEDURE — 99284 EMERGENCY DEPT VISIT MOD MDM: CPT

## 2023-09-11 PROCEDURE — 96361 HYDRATE IV INFUSION ADD-ON: CPT

## 2023-09-11 PROCEDURE — C9113: CPT

## 2023-09-11 PROCEDURE — 36415 COLL VENOUS BLD VENIPUNCTURE: CPT

## 2023-09-11 PROCEDURE — 80053 COMPREHEN METABOLIC PANEL: CPT

## 2023-09-11 PROCEDURE — 99284 EMERGENCY DEPT VISIT MOD MDM: CPT | Mod: 25

## 2023-09-11 PROCEDURE — 74018 RADEX ABDOMEN 1 VIEW: CPT | Mod: 26

## 2023-09-11 PROCEDURE — 96374 THER/PROPH/DIAG INJ IV PUSH: CPT

## 2023-09-11 PROCEDURE — 85025 COMPLETE CBC W/AUTO DIFF WBC: CPT

## 2023-09-11 PROCEDURE — 74018 RADEX ABDOMEN 1 VIEW: CPT

## 2023-09-11 RX ORDER — PANTOPRAZOLE SODIUM 20 MG/1
40 TABLET, DELAYED RELEASE ORAL ONCE
Refills: 0 | Status: DISCONTINUED | OUTPATIENT
Start: 2023-09-11 | End: 2023-09-11

## 2023-09-11 RX ORDER — SODIUM CHLORIDE 9 MG/ML
1000 INJECTION, SOLUTION INTRAVENOUS ONCE
Refills: 0 | Status: COMPLETED | OUTPATIENT
Start: 2023-09-11 | End: 2023-09-11

## 2023-09-11 RX ORDER — PANTOPRAZOLE SODIUM 20 MG/1
40 TABLET, DELAYED RELEASE ORAL ONCE
Refills: 0 | Status: COMPLETED | OUTPATIENT
Start: 2023-09-11 | End: 2023-09-11

## 2023-09-11 RX ADMIN — SODIUM CHLORIDE 1000 MILLILITER(S): 9 INJECTION, SOLUTION INTRAVENOUS at 19:50

## 2023-09-11 RX ADMIN — SODIUM CHLORIDE 1000 MILLILITER(S): 9 INJECTION, SOLUTION INTRAVENOUS at 18:45

## 2023-09-11 RX ADMIN — PANTOPRAZOLE SODIUM 40 MILLIGRAM(S): 20 TABLET, DELAYED RELEASE ORAL at 19:40

## 2023-09-11 RX ADMIN — Medication 1 ENEMA: at 19:40

## 2023-09-11 NOTE — ED PROVIDER NOTE - PATIENT PORTAL LINK FT
You can access the FollowMyHealth Patient Portal offered by [facility] by registering at the following website: [email or web address]. By joining [employer]’s FollowMyHealth portal, you will also be able to view your health information using other applications (apps) compatible with our system.

## 2023-09-11 NOTE — ED PROVIDER NOTE - PROGRESS NOTE DETAILS
TJY: blood results including bilirubin d/w patient and family including importance of attending GI appt for tmrw. Instructed to discontinue the miralax until seen by GI tomorrow. TJY: pt with small volume BM since enema, encouraged to eat fibrous foods at home.

## 2023-09-11 NOTE — ED PROVIDER NOTE - OBJECTIVE STATEMENT
21yo male no PMHx presenting to ED with constipation x1 week associated with dull ache to the right side of his abdomen, in addition to loss of appetite and nbnb emesis x1 yesterday. Pt has been taking high dose Miralax for constipation without large volume BM, but continuous liquid stool. No F/C. Pt here with same complaint yesterday, second presentation due to non resolution. Has outpatient GI appt scheduled tomorrow.

## 2023-09-11 NOTE — ED PROVIDER NOTE - CLINICAL SUMMARY MEDICAL DECISION MAKING FREE TEXT BOX
22-year-old male seen in the ED September 9 for right flank pain rating to his right lower quadrant as well as constipation at that time he had blood work done UA was negative KUB that demonstrated mild to moderate stool burden and a CAT scan performed that showed no evidence of acute abdominal pathology patient was given a Fleet enema and at the time but states has not had a bowel movement after the Fleet enema was discharged and told to use 14 scoops of MiraLAX in a 2 gallon bottle which has been using called his PMD who also prescribed Ex-Lax and now has been having just yellowish water come out of his stool but no actual formed stool which is what prompted the visit patient has an appointment with gastroenterologist Dr. Cristobal tomorrow.  Patient also had an episode of nausea no fevers vs reviewed labs imaging obtained and reviewed KUB looked improved from prior visit. Patient a spoken to in detail about results  All questions addressed.  Results of ED work up discussed and patient given a copy of the results. Patient has proper follow up. Return precautions given. patient to follow up with GI dr. cristobal following day.

## 2023-09-11 NOTE — ED ADULT TRIAGE NOTE - CHIEF COMPLAINT QUOTE
Patient a+ox3 co right side abdominal pain X 1 week with constipation and nausea- reports was seen in ED 9/9 for same complaint and dc given enema but still having pain

## 2023-09-11 NOTE — ED PROVIDER NOTE - PHYSICAL EXAMINATION
VITAL SIGNS: I have reviewed nursing notes and confirm.  CONSTITUTIONAL: Well-appearing, non-toxic, in NAD  SKIN: Warm dry, normal skin turgor  HEAD: NCAT  EYES: No conjunctival injection, scleral anicteric  ENT: MMM  NECK: Supple; FROM.   ABD: Soft, NDNT. No CVAT.   EXT: Normal gait.   NEURO: Grossly normal examination, CN grossly intact.   PSYCH: Cooperative, appropriate

## 2023-09-11 NOTE — ED PROVIDER NOTE - ATTENDING CONTRIBUTION TO CARE
22-year-old male seen in the ED September 9 for right flank pain rating to his right lower quadrant as well as constipation at that time he had blood work done UA was negative KUB that demonstrated mild to moderate stool burden and a CAT scan performed that showed no evidence of acute abdominal pathology patient was given a Fleet enema and at the time but states has not had a bowel movement after the Fleet enema was discharged and told to use 14 scoops of MiraLAX in a 2 gallon bottle which has been using called his PMD who also prescribed Ex-Lax and now has been having just yellowish water come out of his stool but no actual formed stool which is what prompted the visit patient has an appointment with gastroenterologist Dr. Cristobal tomorrow.  Patient also had an episode of nausea no fevers   CONSTITUTIONAL: WA / WN / NAD  HEAD: NCAT  EYES: PERRL; EOMI; anicteric.  ENT: Normal pharynx; membranes pink/moist, no erythema.  NECK: Supple;   ABD: Soft, NT ND no cv  MSK/EXT: No gross deformities; full range of motion.  SKIN: Warm and dry;   NEURO: AAOx3, Motor 5/5 x 4 extremities  PSYCH: Memory Intact, Normal Affect

## 2023-09-11 NOTE — ED PROVIDER NOTE - NSFOLLOWUPINSTRUCTIONS_ED_ALL_ED_FT
Hold off on Miralax until attending your gastroenterology appointment tomorrow.  You can resume eating foods, especially those high in fiber.  Drink plenty of water.  You can take tylenol at home for any worsening pain.     Constipation    Constipation is when a person has fewer than three bowel movements a week, has difficulty having a bowel movement, or has stools that are dry, hard, or larger than normal. Other symptoms can include abdominal pain or bloating. As people grow older, constipation is more common. A low-fiber diet, not taking in enough fluids, and taking certain medicines, including opioid painkillers, may make constipation worse. Treatment varies but may include dietary modifications (more fiber-rich foods), lifestyle modifications, and possible medications.     SEEK IMMEDIATE MEDICAL CARE IF YOU HAVE ANY OF THE FOLLOWING SYMPTOMS: bright red blood in your stool, constipation for longer than 4 days, abdominal or rectal pain, unexplained weight loss, or inability to pass gas.

## 2023-09-12 LAB
N GONORRHOEA RRNA SPEC QL NAA+PROBE: SIGNIFICANT CHANGE UP
SPECIMEN SOURCE: SIGNIFICANT CHANGE UP

## 2023-09-14 ENCOUNTER — OUTPATIENT (OUTPATIENT)
Dept: OUTPATIENT SERVICES | Facility: HOSPITAL | Age: 23
LOS: 1 days | End: 2023-09-14
Payer: COMMERCIAL

## 2023-09-14 DIAGNOSIS — K59.00 CONSTIPATION, UNSPECIFIED: ICD-10-CM

## 2023-09-14 DIAGNOSIS — Z00.8 ENCOUNTER FOR OTHER GENERAL EXAMINATION: ICD-10-CM

## 2023-09-14 PROCEDURE — 76700 US EXAM ABDOM COMPLETE: CPT

## 2023-09-14 PROCEDURE — 76700 US EXAM ABDOM COMPLETE: CPT | Mod: 26

## 2023-09-15 DIAGNOSIS — K59.00 CONSTIPATION, UNSPECIFIED: ICD-10-CM

## 2023-12-09 ENCOUNTER — NON-APPOINTMENT (OUTPATIENT)
Age: 23
End: 2023-12-09

## 2023-12-13 ENCOUNTER — EMERGENCY (EMERGENCY)
Facility: HOSPITAL | Age: 23
LOS: 0 days | Discharge: HOME | End: 2023-12-13
Attending: EMERGENCY MEDICINE
Payer: COMMERCIAL

## 2023-12-13 VITALS
HEIGHT: 71 IN | OXYGEN SATURATION: 100 % | DIASTOLIC BLOOD PRESSURE: 56 MMHG | HEART RATE: 73 BPM | SYSTOLIC BLOOD PRESSURE: 121 MMHG | RESPIRATION RATE: 20 BRPM | WEIGHT: 145.06 LBS | TEMPERATURE: 98 F

## 2023-12-13 DIAGNOSIS — B34.9 VIRAL INFECTION, UNSPECIFIED: ICD-10-CM

## 2023-12-13 DIAGNOSIS — Z91.018 ALLERGY TO OTHER FOODS: ICD-10-CM

## 2023-12-13 DIAGNOSIS — R06.02 SHORTNESS OF BREATH: ICD-10-CM

## 2023-12-13 DIAGNOSIS — R42 DIZZINESS AND GIDDINESS: ICD-10-CM

## 2023-12-13 DIAGNOSIS — R05.9 COUGH, UNSPECIFIED: ICD-10-CM

## 2023-12-13 DIAGNOSIS — Z20.822 CONTACT WITH AND (SUSPECTED) EXPOSURE TO COVID-19: ICD-10-CM

## 2023-12-13 LAB
ALBUMIN SERPL ELPH-MCNC: 4.5 G/DL — SIGNIFICANT CHANGE UP (ref 3.5–5.2)
ALBUMIN SERPL ELPH-MCNC: 4.5 G/DL — SIGNIFICANT CHANGE UP (ref 3.5–5.2)
ALP SERPL-CCNC: 92 U/L — SIGNIFICANT CHANGE UP (ref 30–115)
ALP SERPL-CCNC: 92 U/L — SIGNIFICANT CHANGE UP (ref 30–115)
ALT FLD-CCNC: 14 U/L — SIGNIFICANT CHANGE UP (ref 0–41)
ALT FLD-CCNC: 14 U/L — SIGNIFICANT CHANGE UP (ref 0–41)
ANION GAP SERPL CALC-SCNC: 14 MMOL/L — SIGNIFICANT CHANGE UP (ref 7–14)
ANION GAP SERPL CALC-SCNC: 14 MMOL/L — SIGNIFICANT CHANGE UP (ref 7–14)
AST SERPL-CCNC: 13 U/L — SIGNIFICANT CHANGE UP (ref 0–41)
AST SERPL-CCNC: 13 U/L — SIGNIFICANT CHANGE UP (ref 0–41)
BASOPHILS # BLD AUTO: 0.02 K/UL — SIGNIFICANT CHANGE UP (ref 0–0.2)
BASOPHILS # BLD AUTO: 0.02 K/UL — SIGNIFICANT CHANGE UP (ref 0–0.2)
BASOPHILS NFR BLD AUTO: 0.3 % — SIGNIFICANT CHANGE UP (ref 0–1)
BASOPHILS NFR BLD AUTO: 0.3 % — SIGNIFICANT CHANGE UP (ref 0–1)
BILIRUB SERPL-MCNC: 0.9 MG/DL — SIGNIFICANT CHANGE UP (ref 0.2–1.2)
BILIRUB SERPL-MCNC: 0.9 MG/DL — SIGNIFICANT CHANGE UP (ref 0.2–1.2)
BUN SERPL-MCNC: 15 MG/DL — SIGNIFICANT CHANGE UP (ref 10–20)
BUN SERPL-MCNC: 15 MG/DL — SIGNIFICANT CHANGE UP (ref 10–20)
CALCIUM SERPL-MCNC: 9.4 MG/DL — SIGNIFICANT CHANGE UP (ref 8.4–10.5)
CALCIUM SERPL-MCNC: 9.4 MG/DL — SIGNIFICANT CHANGE UP (ref 8.4–10.5)
CHLORIDE SERPL-SCNC: 101 MMOL/L — SIGNIFICANT CHANGE UP (ref 98–110)
CHLORIDE SERPL-SCNC: 101 MMOL/L — SIGNIFICANT CHANGE UP (ref 98–110)
CO2 SERPL-SCNC: 25 MMOL/L — SIGNIFICANT CHANGE UP (ref 17–32)
CO2 SERPL-SCNC: 25 MMOL/L — SIGNIFICANT CHANGE UP (ref 17–32)
CREAT SERPL-MCNC: 0.9 MG/DL — SIGNIFICANT CHANGE UP (ref 0.7–1.5)
CREAT SERPL-MCNC: 0.9 MG/DL — SIGNIFICANT CHANGE UP (ref 0.7–1.5)
EGFR: 125 ML/MIN/1.73M2 — SIGNIFICANT CHANGE UP
EGFR: 125 ML/MIN/1.73M2 — SIGNIFICANT CHANGE UP
EOSINOPHIL # BLD AUTO: 0.03 K/UL — SIGNIFICANT CHANGE UP (ref 0–0.7)
EOSINOPHIL # BLD AUTO: 0.03 K/UL — SIGNIFICANT CHANGE UP (ref 0–0.7)
EOSINOPHIL NFR BLD AUTO: 0.4 % — SIGNIFICANT CHANGE UP (ref 0–8)
EOSINOPHIL NFR BLD AUTO: 0.4 % — SIGNIFICANT CHANGE UP (ref 0–8)
FLUAV AG NPH QL: SIGNIFICANT CHANGE UP
FLUAV AG NPH QL: SIGNIFICANT CHANGE UP
FLUBV AG NPH QL: SIGNIFICANT CHANGE UP
FLUBV AG NPH QL: SIGNIFICANT CHANGE UP
GLUCOSE SERPL-MCNC: 107 MG/DL — HIGH (ref 70–99)
GLUCOSE SERPL-MCNC: 107 MG/DL — HIGH (ref 70–99)
HCT VFR BLD CALC: 41.3 % — LOW (ref 42–52)
HCT VFR BLD CALC: 41.3 % — LOW (ref 42–52)
HGB BLD-MCNC: 14.4 G/DL — SIGNIFICANT CHANGE UP (ref 14–18)
HGB BLD-MCNC: 14.4 G/DL — SIGNIFICANT CHANGE UP (ref 14–18)
IMM GRANULOCYTES NFR BLD AUTO: 0.1 % — SIGNIFICANT CHANGE UP (ref 0.1–0.3)
IMM GRANULOCYTES NFR BLD AUTO: 0.1 % — SIGNIFICANT CHANGE UP (ref 0.1–0.3)
LIDOCAIN IGE QN: 19 U/L — SIGNIFICANT CHANGE UP (ref 7–60)
LIDOCAIN IGE QN: 19 U/L — SIGNIFICANT CHANGE UP (ref 7–60)
LYMPHOCYTES # BLD AUTO: 1.63 K/UL — SIGNIFICANT CHANGE UP (ref 1.2–3.4)
LYMPHOCYTES # BLD AUTO: 1.63 K/UL — SIGNIFICANT CHANGE UP (ref 1.2–3.4)
LYMPHOCYTES # BLD AUTO: 24.4 % — SIGNIFICANT CHANGE UP (ref 20.5–51.1)
LYMPHOCYTES # BLD AUTO: 24.4 % — SIGNIFICANT CHANGE UP (ref 20.5–51.1)
MCHC RBC-ENTMCNC: 30.1 PG — SIGNIFICANT CHANGE UP (ref 27–31)
MCHC RBC-ENTMCNC: 30.1 PG — SIGNIFICANT CHANGE UP (ref 27–31)
MCHC RBC-ENTMCNC: 34.9 G/DL — SIGNIFICANT CHANGE UP (ref 32–37)
MCHC RBC-ENTMCNC: 34.9 G/DL — SIGNIFICANT CHANGE UP (ref 32–37)
MCV RBC AUTO: 86.2 FL — SIGNIFICANT CHANGE UP (ref 80–94)
MCV RBC AUTO: 86.2 FL — SIGNIFICANT CHANGE UP (ref 80–94)
MONOCYTES # BLD AUTO: 0.53 K/UL — SIGNIFICANT CHANGE UP (ref 0.1–0.6)
MONOCYTES # BLD AUTO: 0.53 K/UL — SIGNIFICANT CHANGE UP (ref 0.1–0.6)
MONOCYTES NFR BLD AUTO: 7.9 % — SIGNIFICANT CHANGE UP (ref 1.7–9.3)
MONOCYTES NFR BLD AUTO: 7.9 % — SIGNIFICANT CHANGE UP (ref 1.7–9.3)
NEUTROPHILS # BLD AUTO: 4.47 K/UL — SIGNIFICANT CHANGE UP (ref 1.4–6.5)
NEUTROPHILS # BLD AUTO: 4.47 K/UL — SIGNIFICANT CHANGE UP (ref 1.4–6.5)
NEUTROPHILS NFR BLD AUTO: 66.9 % — SIGNIFICANT CHANGE UP (ref 42.2–75.2)
NEUTROPHILS NFR BLD AUTO: 66.9 % — SIGNIFICANT CHANGE UP (ref 42.2–75.2)
NRBC # BLD: 0 /100 WBCS — SIGNIFICANT CHANGE UP (ref 0–0)
NRBC # BLD: 0 /100 WBCS — SIGNIFICANT CHANGE UP (ref 0–0)
PLATELET # BLD AUTO: 233 K/UL — SIGNIFICANT CHANGE UP (ref 130–400)
PLATELET # BLD AUTO: 233 K/UL — SIGNIFICANT CHANGE UP (ref 130–400)
PMV BLD: 10.2 FL — SIGNIFICANT CHANGE UP (ref 7.4–10.4)
PMV BLD: 10.2 FL — SIGNIFICANT CHANGE UP (ref 7.4–10.4)
POTASSIUM SERPL-MCNC: 4.3 MMOL/L — SIGNIFICANT CHANGE UP (ref 3.5–5)
POTASSIUM SERPL-MCNC: 4.3 MMOL/L — SIGNIFICANT CHANGE UP (ref 3.5–5)
POTASSIUM SERPL-SCNC: 4.3 MMOL/L — SIGNIFICANT CHANGE UP (ref 3.5–5)
POTASSIUM SERPL-SCNC: 4.3 MMOL/L — SIGNIFICANT CHANGE UP (ref 3.5–5)
PROT SERPL-MCNC: 7.1 G/DL — SIGNIFICANT CHANGE UP (ref 6–8)
PROT SERPL-MCNC: 7.1 G/DL — SIGNIFICANT CHANGE UP (ref 6–8)
RBC # BLD: 4.79 M/UL — SIGNIFICANT CHANGE UP (ref 4.7–6.1)
RBC # BLD: 4.79 M/UL — SIGNIFICANT CHANGE UP (ref 4.7–6.1)
RBC # FLD: 11.6 % — SIGNIFICANT CHANGE UP (ref 11.5–14.5)
RBC # FLD: 11.6 % — SIGNIFICANT CHANGE UP (ref 11.5–14.5)
RSV RNA NPH QL NAA+NON-PROBE: SIGNIFICANT CHANGE UP
RSV RNA NPH QL NAA+NON-PROBE: SIGNIFICANT CHANGE UP
SARS-COV-2 RNA SPEC QL NAA+PROBE: SIGNIFICANT CHANGE UP
SARS-COV-2 RNA SPEC QL NAA+PROBE: SIGNIFICANT CHANGE UP
SODIUM SERPL-SCNC: 140 MMOL/L — SIGNIFICANT CHANGE UP (ref 135–146)
SODIUM SERPL-SCNC: 140 MMOL/L — SIGNIFICANT CHANGE UP (ref 135–146)
TROPONIN T SERPL-MCNC: <0.01 NG/ML — SIGNIFICANT CHANGE UP
TROPONIN T SERPL-MCNC: <0.01 NG/ML — SIGNIFICANT CHANGE UP
WBC # BLD: 6.69 K/UL — SIGNIFICANT CHANGE UP (ref 4.8–10.8)
WBC # BLD: 6.69 K/UL — SIGNIFICANT CHANGE UP (ref 4.8–10.8)
WBC # FLD AUTO: 6.69 K/UL — SIGNIFICANT CHANGE UP (ref 4.8–10.8)
WBC # FLD AUTO: 6.69 K/UL — SIGNIFICANT CHANGE UP (ref 4.8–10.8)

## 2023-12-13 PROCEDURE — 0241U: CPT

## 2023-12-13 PROCEDURE — 99285 EMERGENCY DEPT VISIT HI MDM: CPT | Mod: 25

## 2023-12-13 PROCEDURE — 80053 COMPREHEN METABOLIC PANEL: CPT

## 2023-12-13 PROCEDURE — 99285 EMERGENCY DEPT VISIT HI MDM: CPT

## 2023-12-13 PROCEDURE — 71045 X-RAY EXAM CHEST 1 VIEW: CPT | Mod: 26

## 2023-12-13 PROCEDURE — 83690 ASSAY OF LIPASE: CPT

## 2023-12-13 PROCEDURE — 84484 ASSAY OF TROPONIN QUANT: CPT

## 2023-12-13 PROCEDURE — 93005 ELECTROCARDIOGRAM TRACING: CPT

## 2023-12-13 PROCEDURE — 71045 X-RAY EXAM CHEST 1 VIEW: CPT

## 2023-12-13 PROCEDURE — 36415 COLL VENOUS BLD VENIPUNCTURE: CPT

## 2023-12-13 PROCEDURE — 85025 COMPLETE CBC W/AUTO DIFF WBC: CPT

## 2023-12-13 PROCEDURE — 93010 ELECTROCARDIOGRAM REPORT: CPT

## 2023-12-13 RX ORDER — SODIUM CHLORIDE 9 MG/ML
1000 INJECTION, SOLUTION INTRAVENOUS ONCE
Refills: 0 | Status: COMPLETED | OUTPATIENT
Start: 2023-12-13 | End: 2023-12-13

## 2023-12-13 RX ADMIN — SODIUM CHLORIDE 1000 MILLILITER(S): 9 INJECTION, SOLUTION INTRAVENOUS at 14:26

## 2023-12-13 NOTE — ED ADULT NURSE NOTE - NSFALLUNIVINTERV_ED_ALL_ED
Bed/Stretcher in lowest position, wheels locked, appropriate side rails in place/Call bell, personal items and telephone in reach/Instruct patient to call for assistance before getting out of bed/chair/stretcher/Non-slip footwear applied when patient is off stretcher/[place] to call system/Physically safe environment - no spills, clutter or unnecessary equipment/Purposeful proactive rounding/Room/bathroom lighting operational, light cord in reach Bed/Stretcher in lowest position, wheels locked, appropriate side rails in place/Call bell, personal items and telephone in reach/Instruct patient to call for assistance before getting out of bed/chair/stretcher/Non-slip footwear applied when patient is off stretcher/[place] to call system/Physically safe environment - no spills, clutter or unnecessary equipment/Purposeful proactive rounding/Room/bathroom lighting operational, light cord in reach

## 2023-12-13 NOTE — ED PROVIDER NOTE - OBJECTIVE STATEMENT
23-year-old male with no past medical history presenting for evaluation of cough, dizziness, and feeling out of breath while at work today.  Patient states that he has been having viral-like symptoms over the last few days.  Denies any chest pain.  Patient also reports feeling tingling throughout his body.  No fevers or chills.  No leg swelling or calf pain.

## 2023-12-13 NOTE — ED PROVIDER NOTE - PATIENT PORTAL LINK FT
You can access the FollowMyHealth Patient Portal offered by [facility] by registering at the following website: [email or web address]. By joining [employer]’s FollowMyHealth portal, you will also be able to view your health information using other applications (apps) compatible with our system. You can access the FollowMyHealth Patient Portal offered by [facility] by registering at the following website: [email or web address]. By joining [employer]’s FollowMyHealth portal, you will also be able to view your health information using other applications (apps) compatible with our system.

## 2023-12-13 NOTE — ED PROVIDER NOTE - COVID-19 ORDERING FACILITY
[facility]  - [facility]  - [facility]

## 2023-12-13 NOTE — ED ADULT TRIAGE NOTE - BMI (KG/M2)
Caller: Burke Cedeno Jr    Relationship: Self    Best call back number: [phone]    What medication are you requesting: PAIN MEDICATION     What are your current symptoms: BACK PAIN     How long have you been experiencing symptoms: ONE WEEK     Have you had these symptoms before:    [] Yes  [x] No    Have you been treated for these symptoms before:   [] Yes  [x] No    If a prescription is needed, what is your preferred pharmacy and phone number: [id number] - [place], [place] - [address] [phone] [facility] [phone]         20.2

## 2023-12-13 NOTE — ED ADULT NURSE NOTE - COVID-19 ORDERING FACILITY
[facility]  - [facility]  - [facility]

## 2023-12-13 NOTE — ED PROVIDER NOTE - CLINICAL SUMMARY MEDICAL DECISION MAKING FREE TEXT BOX
Patient treated with IV fluids.  Labs and chest x-ray were obtained.  Chest x-ray results reviewed as well as labs.  Results yakelin patient and mom.  Patient is feeling better.  Recommend continued supportive care.  Patient to follow-up PMD and return if any worsening symptoms or concerns

## 2023-12-13 NOTE — ED PROVIDER NOTE - ATTENDING APP SHARED VISIT CONTRIBUTION OF CARE
22 yo M Presents with mom for evaluation of cough for the last few days.  Patient states he had fever which is resolved and today was the first day he returned back to work.  Patient states he started to feel short of breath at work and having tingling to his hands feet and face.  No chest pain, on exam patient in NAD, AAOx3, lungs CTA B/L, OP clear, 24 yo M Presents with mom for evaluation of cough for the last few days.  Patient states he had fever which is resolved and today was the first day he returned back to work.  Patient states he started to feel short of breath at work and having tingling to his hands feet and face.  No chest pain, on exam patient in NAD, AAOx3, lungs CTA B/L, OP clear,

## 2024-02-09 ENCOUNTER — EMERGENCY (EMERGENCY)
Facility: HOSPITAL | Age: 24
LOS: 0 days | Discharge: HOME | End: 2024-02-10
Attending: EMERGENCY MEDICINE
Payer: COMMERCIAL

## 2024-02-09 VITALS
DIASTOLIC BLOOD PRESSURE: 60 MMHG | HEART RATE: 82 BPM | OXYGEN SATURATION: 98 % | RESPIRATION RATE: 18 BRPM | TEMPERATURE: 98 F | SYSTOLIC BLOOD PRESSURE: 127 MMHG | HEIGHT: 71 IN | WEIGHT: 145.06 LBS

## 2024-02-09 DIAGNOSIS — M25.552 PAIN IN LEFT HIP: ICD-10-CM

## 2024-02-09 DIAGNOSIS — M25.562 PAIN IN LEFT KNEE: ICD-10-CM

## 2024-02-09 PROCEDURE — 70450 CT HEAD/BRAIN W/O DYE: CPT | Mod: MA

## 2024-02-09 PROCEDURE — 99285 EMERGENCY DEPT VISIT HI MDM: CPT

## 2024-02-09 PROCEDURE — 72170 X-RAY EXAM OF PELVIS: CPT

## 2024-02-09 PROCEDURE — 73564 X-RAY EXAM KNEE 4 OR MORE: CPT | Mod: LT

## 2024-02-09 PROCEDURE — 99284 EMERGENCY DEPT VISIT MOD MDM: CPT | Mod: 25

## 2024-02-09 PROCEDURE — 99053 MED SERV 10PM-8AM 24 HR FAC: CPT

## 2024-02-09 RX ORDER — METHOCARBAMOL 500 MG/1
1500 TABLET, FILM COATED ORAL ONCE
Refills: 0 | Status: COMPLETED | OUTPATIENT
Start: 2024-02-09 | End: 2024-02-09

## 2024-02-09 RX ORDER — LIDOCAINE 4 G/100G
1 CREAM TOPICAL ONCE
Refills: 0 | Status: COMPLETED | OUTPATIENT
Start: 2024-02-09 | End: 2024-02-09

## 2024-02-09 RX ORDER — IBUPROFEN 200 MG
600 TABLET ORAL ONCE
Refills: 0 | Status: COMPLETED | OUTPATIENT
Start: 2024-02-09 | End: 2024-02-09

## 2024-02-09 RX ADMIN — METHOCARBAMOL 1500 MILLIGRAM(S): 500 TABLET, FILM COATED ORAL at 23:15

## 2024-02-09 RX ADMIN — LIDOCAINE 1 PATCH: 4 CREAM TOPICAL at 23:16

## 2024-02-09 RX ADMIN — Medication 600 MILLIGRAM(S): at 23:15
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
## 2024-02-09 NOTE — ED ADULT NURSE NOTE - NS ED NURSE DC INFO COMPLEXITY
Moderate: Comprehensive teaching Cyclosporine Counseling:  I discussed with the patient the risks of cyclosporine including but not limited to hypertension, gingival hyperplasia,myelosuppression, immunosuppression, liver damage, kidney damage, neurotoxicity, lymphoma, and serious infections. The patient understands that monitoring is required including baseline blood pressure, CBC, CMP, lipid panel and uric acid, and then 1-2 times monthly CMP and blood pressure.

## 2024-02-10 PROCEDURE — 70450 CT HEAD/BRAIN W/O DYE: CPT | Mod: 26,MA

## 2024-02-10 PROCEDURE — 73564 X-RAY EXAM KNEE 4 OR MORE: CPT | Mod: 26,LT

## 2024-02-10 PROCEDURE — 72170 X-RAY EXAM OF PELVIS: CPT | Mod: 26

## 2024-02-10 RX ORDER — MELOXICAM 15 MG/1
1 TABLET ORAL
Qty: 5 | Refills: 0
Start: 2024-02-10 | End: 2024-02-14

## 2024-02-10 RX ORDER — METHOCARBAMOL 500 MG/1
4 TABLET, FILM COATED ORAL
Qty: 20 | Refills: 0
Start: 2024-02-10 | End: 2024-02-14
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
## 2024-02-10 NOTE — ED PROVIDER NOTE - PATIENT PORTAL LINK FT
You can access the FollowMyHealth Patient Portal offered by [facility] by registering at the following website: [email or web address]. By joining [employer]’s FollowMyHealth portal, you will also be able to view your health information using other applications (apps) compatible with our system.

## 2024-02-10 NOTE — ED PROVIDER NOTE - PHYSICAL EXAMINATION
Constitutional: Well developed, well nourished. NAD  TRAUMA: ABC intact. GCS 15  Head: Normocephalic, atraumatic.  Eyes: PERRL. EOMI. No Raccoon eyes.   ENT: No nasal discharge. No septal hematoma. No Richard sign. Mucous membranes moist.  Neck: Supple. Painless ROM. No midline tenderness, stepoffs.  Cardiovascular: Normal S1, S2. Regular rate and rhythm. No murmurs, rubs, or gallops.  Pulmonary: Normal respiratory rate and effort. Lungs clear to auscultation bilaterally. No wheezing, rales, or rhonchi.  CHEST: No chest wall tenderness, crepitus.  Abdominal: Soft. Nondistended. Nontender. No rebound, guarding, rigidity.  BACK: No midline T/L/S tenderness, stepoffs. No saddle paresthesia. + paraspinal ttp at trapezius   Extremities. Pelvis stable. No traumatic deformities, tenderness of extremities.  Skin: No rashes, cyanosis, lacerations, abrasions.  Neuro: AAOx3. Strength 5/5 in all extremities. Sensation intact throughout. No focal neurological deficits.  Psych: Normal mood. Normal affect.
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
## 2024-02-14 ENCOUNTER — APPOINTMENT (OUTPATIENT)
Dept: ORTHOPEDIC SURGERY | Facility: CLINIC | Age: 24
End: 2024-02-14
Payer: COMMERCIAL

## 2024-02-14 VITALS — BODY MASS INDEX: 20.58 KG/M2 | WEIGHT: 147 LBS | HEIGHT: 71 IN

## 2024-02-14 PROCEDURE — 72040 X-RAY EXAM NECK SPINE 2-3 VW: CPT

## 2024-02-14 PROCEDURE — 72110 X-RAY EXAM L-2 SPINE 4/>VWS: CPT

## 2024-02-14 PROCEDURE — 99213 OFFICE O/P EST LOW 20 MIN: CPT | Mod: ACP

## 2024-02-14 NOTE — ASSESSMENT
[FreeTextEntry1] : Recommending neurology consult for his headaches.  For his left knee, cervical spine and lower back recommending physical therapy.  Heat is appropriate.  He is going to take over-the-counter anti-inflammatory.  We will schedule him a follow-up appointment in our spine department and an appointment for his left knee if symptoms worsens or continue further imaging may be warranted with MRIs.
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
## 2024-02-14 NOTE — IMAGING
[de-identified] : On examination of his cervical spine he has tenderness over the right and left trapezius, tenderness of the cervical vertebra.  He does complain of some headaches.  On examination of the lumbar spine tenderness over the paraspinal muscles and lumbar vertebra.  Patient has some tightness in the hamstring with straight leg raise on the right and left otherwise denies back pain with straight leg raise.  Denies any radicular symptoms upper or lower extremity.  5/5 motor strength bilateral lower extremity.  Good range of motion through internal and external rotation of the left and right hip no active groin pain. On examination of the left knee no swelling, no knee effusion, no ecchymosis, erythema.  Skin is intact.  Is able to straight leg raise.  Able to active flex extend his knee.  He has some discomfort through knee flexion and extension mild restriction.  Stable to stress testing.  X-rays reviewed from [facility] pelvis no acute bony abnormality, left knee no fracture or dislocation, CAT scan head no acute intracranial pathology X-ray today cervical spine and lumbar spine no acute bony abnormality

## 2024-02-22 ENCOUNTER — APPOINTMENT (OUTPATIENT)
Dept: NEUROLOGY | Facility: CLINIC | Age: 24
End: 2024-02-22
Payer: COMMERCIAL

## 2024-02-22 DIAGNOSIS — Z87.39 PERSONAL HISTORY OF OTHER DISEASES OF THE MUSCULOSKELETAL SYSTEM AND CONNECTIVE TISSUE: ICD-10-CM

## 2024-02-22 DIAGNOSIS — Z78.9 OTHER SPECIFIED HEALTH STATUS: ICD-10-CM

## 2024-02-22 DIAGNOSIS — S86.912A STRAIN OF UNSPECIFIED MUSCLE(S) AND TENDON(S) AT LOWER LEG LEVEL, LEFT LEG, INITIAL ENCOUNTER: ICD-10-CM

## 2024-02-22 PROCEDURE — 99243 OFF/OP CNSLTJ NEW/EST LOW 30: CPT

## 2024-02-24 PROBLEM — Z78.9 NO KNOWN HEALTH PROBLEMS: Status: RESOLVED | Noted: 2023-07-24 | Resolved: 2024-02-24

## 2024-02-24 PROBLEM — S86.912A STRAIN OF LEFT KNEE, INITIAL ENCOUNTER: Status: ACTIVE | Noted: 2024-02-14

## 2024-02-24 PROBLEM — Z87.39 HISTORY OF BACK PAIN: Status: RESOLVED | Noted: 2024-02-14 | Resolved: 2024-02-24

## 2024-02-24 PROBLEM — Z87.39 HISTORY OF NECK PAIN: Status: RESOLVED | Noted: 2024-02-14 | Resolved: 2024-02-24

## 2024-02-24 NOTE — ASSESSMENT
[FreeTextEntry1] : Cervical/lumbar radiculopathy/cervicogenic headache -MRI of cervical/lumbar spine without gadolinium -Continue with physical therapy - Can continue with NSAIDs/muscle relaxant/lidocaine patch for now - If not better we will send him to pain management for further treatment  Left knee pain - Physical therapy as recommended, follow-up with Ortho  I, Michelle Noriega, Attest that this documentation has been prepared under the direction and in the presence of Provider Noé Harding DO  Thank You for letting me assist in the management of this patient.   Noé Harding DO Board Certified, Neurology
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
## 2024-02-24 NOTE — PHYSICAL EXAM
[Person] : oriented to person [Place] : oriented to place [Time] : oriented to time [Concentration Intact] : normal concentrating ability [Visual Intact] : visual attention was ~T not ~L decreased [Naming Objects] : no difficulty naming common objects [Repeating Phrases] : no difficulty repeating a phrase [Writing A Sentence] : no difficulty writing a sentence [Fluency] : fluency intact [Comprehension] : comprehension intact [Reading] : reading intact [Past History] : adequate knowledge of personal past history [Cranial Nerves Optic (II)] : visual acuity intact bilaterally,  visual fields full to confrontation, pupils equal round and reactive to light [Cranial Nerves Trigeminal (V)] : facial sensation intact symmetrically [Cranial Nerves Oculomotor (III)] : extraocular motion intact [Cranial Nerves Facial (VII)] : face symmetrical [Cranial Nerves Vestibulocochlear (VIII)] : hearing was intact bilaterally [Cranial Nerves Accessory (XI - Cranial And Spinal)] : head turning and shoulder shrug symmetric [Cranial Nerves Glossopharyngeal (IX)] : tongue and palate midline [Motor Tone] : muscle tone was normal in all four extremities [Cranial Nerves Hypoglossal (XII)] : there was no tongue deviation with protrusion [No Muscle Atrophy] : normal bulk in all four extremities [Sensation Tactile Decrease] : light touch was intact [Motor Strength] : muscle strength was normal in all four extremities [Balance] : balance was intact [Abnormal Walk] : normal gait [2+] : Ankle jerk left 2+ [Past-pointing] : there was no past-pointing [Tremor] : no tremor present [Plantar Reflex Right Only] : normal on the right [Plantar Reflex Left Only] : normal on the left [FreeTextEntry6] : Limited range of motion in the neck and back, positive straight leg test bilaterally [FreeTextEntry8] : Antalgic gait

## 2024-03-13 ENCOUNTER — APPOINTMENT (OUTPATIENT)
Dept: ORTHOPEDIC SURGERY | Facility: CLINIC | Age: 24
End: 2024-03-13
Payer: COMMERCIAL

## 2024-03-13 ENCOUNTER — APPOINTMENT (OUTPATIENT)
Dept: MRI IMAGING | Facility: CLINIC | Age: 24
End: 2024-03-13
Payer: COMMERCIAL

## 2024-03-13 PROCEDURE — 99204 OFFICE O/P NEW MOD 45 MIN: CPT

## 2024-03-13 PROCEDURE — 72141 MRI NECK SPINE W/O DYE: CPT

## 2024-03-13 PROCEDURE — 72148 MRI LUMBAR SPINE W/O DYE: CPT
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
## 2024-03-13 NOTE — IMAGING
[de-identified] : TTP midline cervical spine and paraspinal musculature   Strength                                                                     Deltoid   Right: 5/5; Left: 5/5                      Biceps   Right: 5/5; Left: 5/5                   Triceps        Right: 5/5; Left: 5/5                                 Wrist Extensors     Right: 5/5; Left: 5/5 Finger Flexors     Right: 5/5; Left: 5/5 IO    Right: 5/5; Left: 5/5  Sensation C5   Right: 2/2; Left: 2/2 C6   Right: 2/2; Left: 2/2 C7   Right: 2/2; Left: 2/2 C8   Right: 2/2; Left: 2/2 T1   Right: 2/2; Left: 2/2  Reflexes Biceps   Right: 2+; Left 2+ Triceps   Right: 2+; Left 2+ Storey's  Right: Negative; L: Negativ  TTP midline spine and paraspinal musculature  Strength                                          Hip flexor   Right: 5/5; Left: 5/5                              Knee extensor     Right: 5/5; Left: 5/5                      Ankle dorsiflexion   Right: 5/5; Left: 5/5                   EHL           Right: 5/5; Left: 5/5                                 Ankle plantarflexion       Right: 5/5; Left: 5/5  Sensation L1   Right: 2/2; Left: 2/2 L2   Right: 2/2; Left: 2/2 L3   Right: 2/2; Left: 2/2 L4   Right: 2/2; Left: 2/2 L5   Right: 2/2; Left: 2/2 S1   Right: 2/2; Left: 2/2  Reflexes Patella   Right: 2+; Left 2+ Achilles   Right: 2+; Left 2+ Clonus  Right: absent; L: absent

## 2024-03-13 NOTE — DATA REVIEWED
[FreeTextEntry1] : I reviewed the patient's MRI of his lumbar cervical spine that was done on March 13, 2024.  Those demonstrated no spinal cord compression no central canal stenosis no foraminal stenosis well-hydrated disks.  No reports were available yet.

## 2024-03-28 ENCOUNTER — APPOINTMENT (OUTPATIENT)
Dept: ORTHOPEDIC SURGERY | Facility: CLINIC | Age: 24
End: 2024-03-28
Payer: COMMERCIAL

## 2024-03-28 PROCEDURE — 99213 OFFICE O/P EST LOW 20 MIN: CPT | Mod: ACP

## 2024-03-28 NOTE — ASSESSMENT
[FreeTextEntry1] : Will put in request for an MRI left knee rule out tear.  Continue physical therapy.  Follow-up after MRI is completed.
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
## 2024-03-28 NOTE — IMAGING
[de-identified] : On examination of his left knee no knee effusion, no ecchymosis, no erythema.  Skin is intact.  Tenderness is noted along the medial joint line, no tenderness along the lateral joint line.  No tenderness over the patella, taut tender quadricep tendon.  Able to straight leg raise.  He with active flex his knee.  Discomfort through Fely's testing, medially.  Prior x-ray left knee no acute bony normality

## 2024-03-29 ENCOUNTER — APPOINTMENT (OUTPATIENT)
Dept: NEUROLOGY | Facility: CLINIC | Age: 24
End: 2024-03-29
Payer: COMMERCIAL

## 2024-03-29 VITALS — HEART RATE: 71 BPM | DIASTOLIC BLOOD PRESSURE: 74 MMHG | SYSTOLIC BLOOD PRESSURE: 113 MMHG

## 2024-03-29 VITALS — HEIGHT: 71 IN | BODY MASS INDEX: 20.3 KG/M2 | WEIGHT: 145 LBS

## 2024-03-29 DIAGNOSIS — M54.16 RADICULOPATHY, LUMBAR REGION: ICD-10-CM

## 2024-03-29 DIAGNOSIS — M54.12 RADICULOPATHY, CERVICAL REGION: ICD-10-CM

## 2024-03-29 PROCEDURE — 99214 OFFICE O/P EST MOD 30 MIN: CPT

## 2024-03-29 RX ORDER — SUMATRIPTAN 100 MG/1
100 TABLET, FILM COATED ORAL
Qty: 9 | Refills: 11 | Status: ACTIVE | COMMUNITY
Start: 2024-03-29 | End: 1900-01-01

## 2024-03-29 RX ORDER — TIZANIDINE 4 MG/1
4 TABLET ORAL
Qty: 60 | Refills: 1 | Status: ACTIVE | COMMUNITY
Start: 2024-03-29 | End: 1900-01-01

## 2024-03-29 NOTE — PHYSICAL EXAM
[Person] : oriented to person [Place] : oriented to place [Time] : oriented to time [Concentration Intact] : normal concentrating ability [Visual Intact] : visual attention was ~T not ~L decreased [Naming Objects] : no difficulty naming common objects [Writing A Sentence] : no difficulty writing a sentence [Repeating Phrases] : no difficulty repeating a phrase [Fluency] : fluency intact [Comprehension] : comprehension intact [Reading] : reading intact [Past History] : adequate knowledge of personal past history [Cranial Nerves Optic (II)] : visual acuity intact bilaterally,  visual fields full to confrontation, pupils equal round and reactive to light [Cranial Nerves Oculomotor (III)] : extraocular motion intact [Cranial Nerves Trigeminal (V)] : facial sensation intact symmetrically [Cranial Nerves Facial (VII)] : face symmetrical [Cranial Nerves Glossopharyngeal (IX)] : tongue and palate midline [Cranial Nerves Vestibulocochlear (VIII)] : hearing was intact bilaterally [Cranial Nerves Accessory (XI - Cranial And Spinal)] : head turning and shoulder shrug symmetric [Cranial Nerves Hypoglossal (XII)] : there was no tongue deviation with protrusion [Motor Strength] : muscle strength was normal in all four extremities [Motor Tone] : muscle tone was normal in all four extremities [No Muscle Atrophy] : normal bulk in all four extremities [Sensation Tactile Decrease] : light touch was intact [2+] : Ankle jerk right 2+ [Past-pointing] : there was no past-pointing [Tremor] : no tremor present [Plantar Reflex Right Only] : normal on the right [Plantar Reflex Left Only] : normal on the left [FreeTextEntry6] : Limited range of motion in the neck and back,  [FreeTextEntry8] : Antalgic gait
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
## 2024-03-29 NOTE — ASSESSMENT
[FreeTextEntry1] : Cervical/lumbar radiculopathy/cervicogenic headache -Continue with physical therapy - A trial of tizanidine as well as sumatriptan -Since he has done physical therapy without significant improvement, I am also referring to pain management for further evaluation     Noé Harding DO Board Certified, Neurology

## 2024-04-10 ENCOUNTER — APPOINTMENT (OUTPATIENT)
Dept: MRI IMAGING | Facility: CLINIC | Age: 24
End: 2024-04-10
Payer: COMMERCIAL

## 2024-04-10 PROCEDURE — 73721 MRI JNT OF LWR EXTRE W/O DYE: CPT | Mod: LT

## 2024-04-17 NOTE — ED PROVIDER NOTE - CHIEF COMPLAINT
The patient is a 22y Male complaining of eye pain/injury.
Adequate: hears normal conversation without difficulty

## 2024-04-17 NOTE — ED PEDIATRIC TRIAGE NOTE - TEMPERATURE IN [place]HREN[place]IT (DEGREES F)
Transition of Care Plan:     RUR: 22%  Disposition: [facility]  Follow up appointments:PCP  DME needed: None  Transportation at Discharge: Pt's mother will transport at d/c.   [place] or means to access home: Pt has access       IM Medicare Letter: Pt has Medicaid  Is patient a BCPI-A Bundle:  N/A                    If yes, was Bundle Letter given?:  N/A  Is patient a  and connected with the [address] [place] [address] Street  If yes, was [employer] transfer form completed and [facility] notified? [facility] Contact[facility] [phone]  Discharge Caregiver contacted prior to discharge?  CM will notify caregiver at d/c.   Care Conference needed? :     No    1:20pm- CM sent IV Abx order to [phone] via [employer] to see if they are able to provide it for pt.     10:39am- CM met with pt at bedside to discuss d/c plan. C No new needs at this time. CM will continue to follow pt for d/c planning needs.      Jv Gardner [address] [place] [address]  [phone]     
Transplant surgery team
[phone]

## 2024-05-02 ENCOUNTER — APPOINTMENT (OUTPATIENT)
Dept: ORTHOPEDIC SURGERY | Facility: CLINIC | Age: 24
End: 2024-05-02

## 2024-05-31 ENCOUNTER — APPOINTMENT (OUTPATIENT)
Dept: NEUROLOGY | Facility: CLINIC | Age: 24
End: 2024-05-31
Payer: COMMERCIAL

## 2024-05-31 VITALS — HEIGHT: 70 IN | WEIGHT: 145 LBS | BODY MASS INDEX: 20.76 KG/M2

## 2024-05-31 VITALS — SYSTOLIC BLOOD PRESSURE: 117 MMHG | DIASTOLIC BLOOD PRESSURE: 72 MMHG | HEART RATE: 69 BPM

## 2024-05-31 DIAGNOSIS — G44.209 TENSION-TYPE HEADACHE, UNSPECIFIED, NOT INTRACTABLE: ICD-10-CM

## 2024-05-31 DIAGNOSIS — S16.1XXA STRAIN OF MUSCLE, FASCIA AND TENDON AT NECK LEVEL, INITIAL ENCOUNTER: ICD-10-CM

## 2024-05-31 DIAGNOSIS — S39.012A STRAIN OF MUSCLE, FASCIA AND TENDON OF LOWER BACK, INITIAL ENCOUNTER: ICD-10-CM

## 2024-05-31 PROCEDURE — 99214 OFFICE O/P EST MOD 30 MIN: CPT | Mod: ACP
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
## 2024-05-31 NOTE — PHYSICAL EXAM
[Person] : oriented to person [Place] : oriented to place [Time] : oriented to time [Concentration Intact] : normal concentrating ability [Visual Intact] : visual attention was ~T not ~L decreased [Naming Objects] : no difficulty naming common objects [Repeating Phrases] : no difficulty repeating a phrase [Writing A Sentence] : no difficulty writing a sentence [Fluency] : fluency intact [Comprehension] : comprehension intact [Reading] : reading intact [Past History] : adequate knowledge of personal past history [Cranial Nerves Optic (II)] : visual acuity intact bilaterally,  visual fields full to confrontation, pupils equal round and reactive to light [Cranial Nerves Oculomotor (III)] : extraocular motion intact [Cranial Nerves Trigeminal (V)] : facial sensation intact symmetrically [Cranial Nerves Facial (VII)] : face symmetrical [Cranial Nerves Vestibulocochlear (VIII)] : hearing was intact bilaterally [Cranial Nerves Glossopharyngeal (IX)] : tongue and palate midline [Cranial Nerves Accessory (XI - Cranial And Spinal)] : head turning and shoulder shrug symmetric [Cranial Nerves Hypoglossal (XII)] : there was no tongue deviation with protrusion [Motor Strength] : muscle strength was normal in all four extremities [No Muscle Atrophy] : normal bulk in all four extremities [Sensation Tactile Decrease] : light touch was intact [2+] : Ankle jerk left 2+ [Motor Strength Upper Extremities Bilaterally] : strength was normal in both upper extremities [Motor Strength Lower Extremities Bilaterally] : strength was normal in both lower extremities [Past-pointing] : there was no past-pointing [Tremor] : no tremor present [Plantar Reflex Right Only] : normal on the right [Plantar Reflex Left Only] : normal on the left [FreeTextEntry8] : Antalgic gait [PERRL With Normal Accommodation] : pupils were equal in size, round, reactive to light, with normal accommodation [Extraocular Movements] : extraocular movements were intact [Hearing Threshold Finger Rub Not [place]] : hearing was normal [Neck Appearance] : the appearance of the neck was normal [Motor Tone] : muscle strength and tone were normal

## 2024-06-03 ENCOUNTER — APPOINTMENT (OUTPATIENT)
Dept: ORTHOPEDIC SURGERY | Facility: CLINIC | Age: 24
End: 2024-06-03
Payer: COMMERCIAL

## 2024-06-03 ENCOUNTER — NON-APPOINTMENT (OUTPATIENT)
Age: 24
End: 2024-06-03

## 2024-06-03 DIAGNOSIS — S86.912D STRAIN OF UNSPECIFIED MUSCLE(S) AND TENDON(S) AT LOWER LEG LEVEL, LEFT LEG, SUBSEQUENT ENCOUNTER: ICD-10-CM

## 2024-06-03 PROCEDURE — 99213 OFFICE O/P EST LOW 20 MIN: CPT | Mod: ACP
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
## 2024-06-03 NOTE — ASSESSMENT
[FreeTextEntry1] : Patient remaining sessions of physical therapy.  He is back to work works in construction.  He will follow-up as needed in our sports medicine department if pain worsens or continues.

## 2024-06-03 NOTE — IMAGING
[de-identified] : On examination of the left knee no swelling, no knee effusion, no ecchymosis, no erythema.  Skin is intact.  Patient is able to straight leg raise.  Full range of motion through knee flexion and extension.  Stable to stress testing.  Ambulating well.  MRI in chart equivocal mild ACL sprain no tear.

## 2024-07-18 NOTE — ED PROVIDER NOTE - ED STEMI HIDDEN
Consult Note Adult-Colorectal Surgery note - No surgical intervention indicated, additionally pt not a surgical candidate due to debility/frailty    Nursing note - Complete assist with ADL    Please provide a diagnosis associated with functional status     - Complete immobility due to severe physical disability or frailty  - Functional Quadriplegia  - Other(specify)      Thank you.
hide

## 2024-08-16 DIAGNOSIS — M54.12 RADICULOPATHY, CERVICAL REGION: ICD-10-CM

## 2024-08-16 DIAGNOSIS — M54.16 RADICULOPATHY, LUMBAR REGION: ICD-10-CM

## 2024-08-30 ENCOUNTER — APPOINTMENT (OUTPATIENT)
Dept: NEUROLOGY | Facility: CLINIC | Age: 24
End: 2024-08-30
Payer: COMMERCIAL

## 2024-08-30 DIAGNOSIS — M54.16 RADICULOPATHY, LUMBAR REGION: ICD-10-CM

## 2024-08-30 DIAGNOSIS — S16.1XXA STRAIN OF MUSCLE, FASCIA AND TENDON AT NECK LEVEL, INITIAL ENCOUNTER: ICD-10-CM

## 2024-08-30 PROCEDURE — 99213 OFFICE O/P EST LOW 20 MIN: CPT | Mod: ACP

## 2024-08-30 NOTE — PHYSICAL EXAM
[Person] : oriented to person [Place] : oriented to place [Time] : oriented to time [Concentration Intact] : normal concentrating ability [Visual Intact] : visual attention was ~T not ~L decreased [Naming Objects] : no difficulty naming common objects [Repeating Phrases] : no difficulty repeating a phrase [Writing A Sentence] : no difficulty writing a sentence [Fluency] : fluency intact [Comprehension] : comprehension intact [Reading] : reading intact [Past History] : adequate knowledge of personal past history [Cranial Nerves Optic (II)] : visual acuity intact bilaterally,  visual fields full to confrontation, pupils equal round and reactive to light [Cranial Nerves Oculomotor (III)] : extraocular motion intact [Cranial Nerves Trigeminal (V)] : facial sensation intact symmetrically [Cranial Nerves Facial (VII)] : face symmetrical [Cranial Nerves Vestibulocochlear (VIII)] : hearing was intact bilaterally [Cranial Nerves Glossopharyngeal (IX)] : tongue and palate midline [Cranial Nerves Accessory (XI - Cranial And Spinal)] : head turning and shoulder shrug symmetric [Cranial Nerves Hypoglossal (XII)] : there was no tongue deviation with protrusion [Motor Strength] : muscle strength was normal in all four extremities [No Muscle Atrophy] : normal bulk in all four extremities [Sensation Tactile Decrease] : light touch was intact [2+] : Ankle jerk left 2+ [PERRL With Normal Accommodation] : pupils were equal in size, round, reactive to light, with normal accommodation [Extraocular Movements] : extraocular movements were intact [Hearing Threshold Finger Rub Not [place]] : hearing was normal [Neck Appearance] : the appearance of the neck was normal [Motor Tone] : muscle strength and tone were normal [Motor Strength Upper Extremities Bilaterally] : strength was normal in both upper extremities [Motor Strength Lower Extremities Bilaterally] : strength was normal in both lower extremities [Past-pointing] : there was no past-pointing [Tremor] : no tremor present [Plantar Reflex Right Only] : normal on the right [Plantar Reflex Left Only] : normal on the left [FreeTextEntry8] : Antalgic gait
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
## 2024-11-05 ENCOUNTER — APPOINTMENT (OUTPATIENT)
Dept: NEUROLOGY | Facility: CLINIC | Age: 24
End: 2024-11-05

## 2024-11-27 ENCOUNTER — APPOINTMENT (OUTPATIENT)
Dept: NEUROLOGY | Facility: CLINIC | Age: 24
End: 2024-11-27

## 2024-11-27 ENCOUNTER — APPOINTMENT (OUTPATIENT)
Dept: NEUROLOGY | Facility: CLINIC | Age: 24
End: 2024-11-27
Payer: COMMERCIAL

## 2024-11-27 VITALS
BODY MASS INDEX: 20.76 KG/M2 | SYSTOLIC BLOOD PRESSURE: 117 MMHG | WEIGHT: 145 LBS | DIASTOLIC BLOOD PRESSURE: 71 MMHG | HEART RATE: 67 BPM | HEIGHT: 70 IN

## 2024-11-27 DIAGNOSIS — M54.12 RADICULOPATHY, CERVICAL REGION: ICD-10-CM

## 2024-11-27 DIAGNOSIS — S16.1XXA STRAIN OF MUSCLE, FASCIA AND TENDON AT NECK LEVEL, INITIAL ENCOUNTER: ICD-10-CM

## 2024-11-27 PROCEDURE — 99213 OFFICE O/P EST LOW 20 MIN: CPT | Mod: ACP

## 2024-12-20 ENCOUNTER — APPOINTMENT (OUTPATIENT)
Dept: NEUROLOGY | Facility: CLINIC | Age: 24
End: 2024-12-20

## 2025-02-10 ENCOUNTER — APPOINTMENT (OUTPATIENT)
Dept: NEUROSURGERY | Facility: CLINIC | Age: 25
End: 2025-02-10

## 2025-08-29 ENCOUNTER — TRANSCRIPTION ENCOUNTER (OUTPATIENT)
Age: 25
End: 2025-08-29

## 2025-08-29 ENCOUNTER — INPATIENT (INPATIENT)
Facility: HOSPITAL | Age: 25
LOS: 0 days | Discharge: HOME | DRG: 923 | End: 2025-08-30
Attending: STUDENT IN AN ORGANIZED HEALTH CARE EDUCATION/TRAINING PROGRAM | Admitting: STUDENT IN AN ORGANIZED HEALTH CARE EDUCATION/TRAINING PROGRAM
Payer: COMMERCIAL

## 2025-08-29 VITALS
HEART RATE: 75 BPM | OXYGEN SATURATION: 99 % | HEIGHT: 71 IN | DIASTOLIC BLOOD PRESSURE: 84 MMHG | WEIGHT: 149.91 LBS | TEMPERATURE: 98 F | SYSTOLIC BLOOD PRESSURE: 127 MMHG | RESPIRATION RATE: 18 BRPM

## 2025-08-29 DIAGNOSIS — T75.4XXA ELECTROCUTION, INITIAL ENCOUNTER: ICD-10-CM

## 2025-08-29 LAB
ALBUMIN SERPL ELPH-MCNC: 4.9 G/DL — SIGNIFICANT CHANGE UP (ref 3.5–5.2)
ALP SERPL-CCNC: 102 U/L — SIGNIFICANT CHANGE UP (ref 30–115)
ALT FLD-CCNC: 10 U/L — SIGNIFICANT CHANGE UP (ref 0–41)
ANION GAP SERPL CALC-SCNC: 11 MMOL/L — SIGNIFICANT CHANGE UP (ref 7–14)
APPEARANCE UR: CLEAR — SIGNIFICANT CHANGE UP
AST SERPL-CCNC: 16 U/L — SIGNIFICANT CHANGE UP (ref 0–41)
BACTERIA # UR AUTO: NEGATIVE /HPF — SIGNIFICANT CHANGE UP
BASOPHILS # BLD AUTO: 0.03 K/UL — SIGNIFICANT CHANGE UP (ref 0–0.2)
BASOPHILS NFR BLD AUTO: 0.4 % — SIGNIFICANT CHANGE UP (ref 0–1)
BILIRUB SERPL-MCNC: 1 MG/DL — SIGNIFICANT CHANGE UP (ref 0.2–1.2)
BILIRUB UR-MCNC: NEGATIVE — SIGNIFICANT CHANGE UP
BUN SERPL-MCNC: 13 MG/DL — SIGNIFICANT CHANGE UP (ref 10–20)
CALCIUM SERPL-MCNC: 9.7 MG/DL — SIGNIFICANT CHANGE UP (ref 8.4–10.5)
CAST: 6 /LPF — HIGH (ref 0–4)
CHLORIDE SERPL-SCNC: 104 MMOL/L — SIGNIFICANT CHANGE UP (ref 98–110)
CK SERPL-CCNC: 77 U/L — SIGNIFICANT CHANGE UP (ref 0–225)
CK SERPL-CCNC: 96 U/L — SIGNIFICANT CHANGE UP (ref 0–225)
CO2 SERPL-SCNC: 25 MMOL/L — SIGNIFICANT CHANGE UP (ref 17–32)
COLOR SPEC: YELLOW — SIGNIFICANT CHANGE UP
CREAT SERPL-MCNC: 0.9 MG/DL — SIGNIFICANT CHANGE UP (ref 0.7–1.5)
DIFF PNL FLD: NEGATIVE — SIGNIFICANT CHANGE UP
EGFR: 124 ML/MIN/1.73M2 — SIGNIFICANT CHANGE UP
EGFR: 124 ML/MIN/1.73M2 — SIGNIFICANT CHANGE UP
EOSINOPHIL # BLD AUTO: 0.04 K/UL — SIGNIFICANT CHANGE UP (ref 0–0.7)
EOSINOPHIL NFR BLD AUTO: 0.5 % — SIGNIFICANT CHANGE UP (ref 0–8)
GLUCOSE SERPL-MCNC: 94 MG/DL — SIGNIFICANT CHANGE UP (ref 70–99)
GLUCOSE UR QL: NEGATIVE MG/DL — SIGNIFICANT CHANGE UP
HCT VFR BLD CALC: 41.5 % — LOW (ref 42–52)
HGB BLD-MCNC: 13.9 G/DL — LOW (ref 14–18)
IMM GRANULOCYTES NFR BLD AUTO: 0.4 % — HIGH (ref 0.1–0.3)
KETONES UR QL: 15 MG/DL
LEUKOCYTE ESTERASE UR-ACNC: NEGATIVE — SIGNIFICANT CHANGE UP
LYMPHOCYTES # BLD AUTO: 1.42 K/UL — SIGNIFICANT CHANGE UP (ref 1.2–3.4)
LYMPHOCYTES # BLD AUTO: 19.1 % — LOW (ref 20.5–51.1)
MCHC RBC-ENTMCNC: 29.7 PG — SIGNIFICANT CHANGE UP (ref 27–31)
MCHC RBC-ENTMCNC: 33.5 G/DL — SIGNIFICANT CHANGE UP (ref 32–37)
MCV RBC AUTO: 88.7 FL — SIGNIFICANT CHANGE UP (ref 80–94)
MONOCYTES # BLD AUTO: 0.63 K/UL — HIGH (ref 0.1–0.6)
MONOCYTES NFR BLD AUTO: 8.5 % — SIGNIFICANT CHANGE UP (ref 1.7–9.3)
MUCOUS THREADS # UR AUTO: PRESENT
NEUTROPHILS # BLD AUTO: 5.28 K/UL — SIGNIFICANT CHANGE UP (ref 1.4–6.5)
NEUTROPHILS NFR BLD AUTO: 71.1 % — SIGNIFICANT CHANGE UP (ref 42.2–75.2)
NITRITE UR-MCNC: NEGATIVE — SIGNIFICANT CHANGE UP
NRBC BLD AUTO-RTO: 0 /100 WBCS — SIGNIFICANT CHANGE UP (ref 0–0)
PH UR: 5.5 — SIGNIFICANT CHANGE UP (ref 5–8)
PLATELET # BLD AUTO: 261 K/UL — SIGNIFICANT CHANGE UP (ref 130–400)
PMV BLD: 10.5 FL — HIGH (ref 7.4–10.4)
POTASSIUM SERPL-MCNC: 5.4 MMOL/L — HIGH (ref 3.5–5)
POTASSIUM SERPL-SCNC: 5.4 MMOL/L — HIGH (ref 3.5–5)
PROT SERPL-MCNC: 7.4 G/DL — SIGNIFICANT CHANGE UP (ref 6–8)
PROT UR-MCNC: 30 MG/DL
RBC # BLD: 4.68 M/UL — LOW (ref 4.7–6.1)
RBC # FLD: 11.9 % — SIGNIFICANT CHANGE UP (ref 11.5–14.5)
RBC CASTS # UR COMP ASSIST: 1 /HPF — SIGNIFICANT CHANGE UP (ref 0–4)
SODIUM SERPL-SCNC: 140 MMOL/L — SIGNIFICANT CHANGE UP (ref 135–146)
SP GR SPEC: >1.03 — HIGH (ref 1–1.03)
SQUAMOUS # UR AUTO: 1 /HPF — SIGNIFICANT CHANGE UP (ref 0–5)
TROPONIN T, HIGH SENSITIVITY RESULT: <6 NG/L — SIGNIFICANT CHANGE UP
TROPONIN T, HIGH SENSITIVITY RESULT: <6 NG/L — SIGNIFICANT CHANGE UP
UROBILINOGEN FLD QL: 1 MG/DL — SIGNIFICANT CHANGE UP (ref 0.2–1)
WBC # BLD: 7.43 K/UL — SIGNIFICANT CHANGE UP (ref 4.8–10.8)
WBC # FLD AUTO: 7.43 K/UL — SIGNIFICANT CHANGE UP (ref 4.8–10.8)
WBC UR QL: 1 /HPF — SIGNIFICANT CHANGE UP (ref 0–5)

## 2025-08-29 PROCEDURE — 71045 X-RAY EXAM CHEST 1 VIEW: CPT | Mod: 26

## 2025-08-29 PROCEDURE — 81001 URINALYSIS AUTO W/SCOPE: CPT

## 2025-08-29 PROCEDURE — 87641 MR-STAPH DNA AMP PROBE: CPT

## 2025-08-29 PROCEDURE — 83735 ASSAY OF MAGNESIUM: CPT

## 2025-08-29 PROCEDURE — 99221 1ST HOSP IP/OBS SF/LOW 40: CPT

## 2025-08-29 PROCEDURE — 84484 ASSAY OF TROPONIN QUANT: CPT

## 2025-08-29 PROCEDURE — 93010 ELECTROCARDIOGRAM REPORT: CPT

## 2025-08-29 PROCEDURE — 93005 ELECTROCARDIOGRAM TRACING: CPT

## 2025-08-29 PROCEDURE — 82550 ASSAY OF CK (CPK): CPT

## 2025-08-29 PROCEDURE — 36415 COLL VENOUS BLD VENIPUNCTURE: CPT

## 2025-08-29 PROCEDURE — 87640 STAPH A DNA AMP PROBE: CPT

## 2025-08-29 PROCEDURE — 85025 COMPLETE CBC W/AUTO DIFF WBC: CPT

## 2025-08-29 PROCEDURE — 99285 EMERGENCY DEPT VISIT HI MDM: CPT

## 2025-08-29 PROCEDURE — 80053 COMPREHEN METABOLIC PANEL: CPT

## 2025-08-29 RX ORDER — BACITRACIN 500 UNIT/G
1 OINTMENT (GRAM) TOPICAL
Refills: 0 | Status: DISCONTINUED | OUTPATIENT
Start: 2025-08-29 | End: 2025-08-30

## 2025-08-29 RX ORDER — ACETAMINOPHEN 500 MG/5ML
650 LIQUID (ML) ORAL EVERY 6 HOURS
Refills: 0 | Status: DISCONTINUED | OUTPATIENT
Start: 2025-08-29 | End: 2025-08-30

## 2025-08-29 RX ADMIN — Medication 650 MILLIGRAM(S): at 22:30

## 2025-08-29 RX ADMIN — Medication 1000 MILLILITER(S): at 14:33

## 2025-08-29 RX ADMIN — Medication 650 MILLIGRAM(S): at 21:49

## 2025-08-30 ENCOUNTER — TRANSCRIPTION ENCOUNTER (OUTPATIENT)
Age: 25
End: 2025-08-30

## 2025-08-30 VITALS — WEIGHT: 149.91 LBS | HEIGHT: 70.98 IN

## 2025-08-30 LAB
ALBUMIN SERPL ELPH-MCNC: 4.5 G/DL — SIGNIFICANT CHANGE UP (ref 3.5–5.2)
ALP SERPL-CCNC: 92 U/L — SIGNIFICANT CHANGE UP (ref 30–115)
ALT FLD-CCNC: 10 U/L — SIGNIFICANT CHANGE UP (ref 0–41)
ANION GAP SERPL CALC-SCNC: 10 MMOL/L — SIGNIFICANT CHANGE UP (ref 7–14)
AST SERPL-CCNC: 12 U/L — SIGNIFICANT CHANGE UP (ref 0–41)
BASOPHILS # BLD AUTO: 0.03 K/UL — SIGNIFICANT CHANGE UP (ref 0–0.2)
BASOPHILS NFR BLD AUTO: 0.6 % — SIGNIFICANT CHANGE UP (ref 0–1)
BILIRUB SERPL-MCNC: 1.2 MG/DL — SIGNIFICANT CHANGE UP (ref 0.2–1.2)
BUN SERPL-MCNC: 10 MG/DL — SIGNIFICANT CHANGE UP (ref 10–20)
CALCIUM SERPL-MCNC: 9.5 MG/DL — SIGNIFICANT CHANGE UP (ref 8.4–10.5)
CHLORIDE SERPL-SCNC: 106 MMOL/L — SIGNIFICANT CHANGE UP (ref 98–110)
CK SERPL-CCNC: 73 U/L — SIGNIFICANT CHANGE UP (ref 0–225)
CO2 SERPL-SCNC: 26 MMOL/L — SIGNIFICANT CHANGE UP (ref 17–32)
CREAT SERPL-MCNC: 0.9 MG/DL — SIGNIFICANT CHANGE UP (ref 0.7–1.5)
EGFR: 124 ML/MIN/1.73M2 — SIGNIFICANT CHANGE UP
EGFR: 124 ML/MIN/1.73M2 — SIGNIFICANT CHANGE UP
EOSINOPHIL # BLD AUTO: 0.12 K/UL — SIGNIFICANT CHANGE UP (ref 0–0.7)
EOSINOPHIL NFR BLD AUTO: 2.3 % — SIGNIFICANT CHANGE UP (ref 0–8)
GLUCOSE SERPL-MCNC: 97 MG/DL — SIGNIFICANT CHANGE UP (ref 70–99)
HCT VFR BLD CALC: 41.1 % — LOW (ref 42–52)
HGB BLD-MCNC: 13.6 G/DL — LOW (ref 14–18)
IMM GRANULOCYTES NFR BLD AUTO: 0.4 % — HIGH (ref 0.1–0.3)
LYMPHOCYTES # BLD AUTO: 1.89 K/UL — SIGNIFICANT CHANGE UP (ref 1.2–3.4)
LYMPHOCYTES # BLD AUTO: 35.6 % — SIGNIFICANT CHANGE UP (ref 20.5–51.1)
MAGNESIUM SERPL-MCNC: 2 MG/DL — SIGNIFICANT CHANGE UP (ref 1.8–2.4)
MCHC RBC-ENTMCNC: 29.4 PG — SIGNIFICANT CHANGE UP (ref 27–31)
MCHC RBC-ENTMCNC: 33.1 G/DL — SIGNIFICANT CHANGE UP (ref 32–37)
MCV RBC AUTO: 88.8 FL — SIGNIFICANT CHANGE UP (ref 80–94)
MONOCYTES # BLD AUTO: 0.62 K/UL — HIGH (ref 0.1–0.6)
MONOCYTES NFR BLD AUTO: 11.7 % — HIGH (ref 1.7–9.3)
MRSA PCR RESULT.: SIGNIFICANT CHANGE UP
NEUTROPHILS # BLD AUTO: 2.63 K/UL — SIGNIFICANT CHANGE UP (ref 1.4–6.5)
NEUTROPHILS NFR BLD AUTO: 49.4 % — SIGNIFICANT CHANGE UP (ref 42.2–75.2)
NRBC BLD AUTO-RTO: 0 /100 WBCS — SIGNIFICANT CHANGE UP (ref 0–0)
PLATELET # BLD AUTO: 245 K/UL — SIGNIFICANT CHANGE UP (ref 130–400)
PMV BLD: 10.4 FL — SIGNIFICANT CHANGE UP (ref 7.4–10.4)
POTASSIUM SERPL-MCNC: 5.2 MMOL/L — HIGH (ref 3.5–5)
POTASSIUM SERPL-SCNC: 5.2 MMOL/L — HIGH (ref 3.5–5)
PROT SERPL-MCNC: 6.6 G/DL — SIGNIFICANT CHANGE UP (ref 6–8)
RBC # BLD: 4.63 M/UL — LOW (ref 4.7–6.1)
RBC # FLD: 11.9 % — SIGNIFICANT CHANGE UP (ref 11.5–14.5)
SODIUM SERPL-SCNC: 142 MMOL/L — SIGNIFICANT CHANGE UP (ref 135–146)
TROPONIN T, HIGH SENSITIVITY RESULT: <6 NG/L — SIGNIFICANT CHANGE UP
WBC # BLD: 5.31 K/UL — SIGNIFICANT CHANGE UP (ref 4.8–10.8)
WBC # FLD AUTO: 5.31 K/UL — SIGNIFICANT CHANGE UP (ref 4.8–10.8)

## 2025-08-30 PROCEDURE — 99239 HOSP IP/OBS DSCHRG MGMT >30: CPT

## 2025-08-30 PROCEDURE — 93010 ELECTROCARDIOGRAM REPORT: CPT

## 2025-08-30 RX ADMIN — Medication 1 APPLICATION(S): at 05:21
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
## 2025-09-05 DIAGNOSIS — T31.0 BURNS INVOLVING LESS THAN 10% OF BODY SURFACE: ICD-10-CM

## 2025-09-05 DIAGNOSIS — K21.9 GASTRO-ESOPHAGEAL REFLUX DISEASE WITHOUT ESOPHAGITIS: ICD-10-CM
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
## 2025-09-05 DIAGNOSIS — Z91.018 ALLERGY TO OTHER FOODS: ICD-10-CM

## 2025-09-05 DIAGNOSIS — T23.202A BURN OF SECOND DEGREE OF LEFT HAND, UNSPECIFIED SITE, INITIAL ENCOUNTER: ICD-10-CM
